# Patient Record
Sex: FEMALE | Race: BLACK OR AFRICAN AMERICAN | NOT HISPANIC OR LATINO | Employment: FULL TIME | URBAN - METROPOLITAN AREA
[De-identification: names, ages, dates, MRNs, and addresses within clinical notes are randomized per-mention and may not be internally consistent; named-entity substitution may affect disease eponyms.]

---

## 2017-01-04 ENCOUNTER — OFFICE VISIT (OUTPATIENT)
Dept: ORTHOPEDICS | Facility: CLINIC | Age: 32
End: 2017-01-04
Payer: COMMERCIAL

## 2017-01-04 VITALS
RESPIRATION RATE: 12 BRPM | HEART RATE: 80 BPM | DIASTOLIC BLOOD PRESSURE: 97 MMHG | SYSTOLIC BLOOD PRESSURE: 134 MMHG | HEIGHT: 71 IN | BODY MASS INDEX: 33.61 KG/M2 | WEIGHT: 240.06 LBS

## 2017-01-04 DIAGNOSIS — Z98.890 S/P ACL REPAIR: Primary | ICD-10-CM

## 2017-01-04 DIAGNOSIS — Z98.890 STATUS POST ARTHROSCOPY OF LEFT KNEE: ICD-10-CM

## 2017-01-04 PROCEDURE — 99999 PR PBB SHADOW E&M-EST. PATIENT-LVL III: CPT | Mod: PBBFAC,,, | Performed by: ORTHOPAEDIC SURGERY

## 2017-01-04 PROCEDURE — 99024 POSTOP FOLLOW-UP VISIT: CPT | Mod: S$GLB,,, | Performed by: ORTHOPAEDIC SURGERY

## 2017-01-04 NOTE — MR AVS SNAPSHOT
[facility]  [address] [facility]  [place] [address]  Phone: [phone]  Fax: [phone]                  Brian Moss   2017 3:30 PM   Office Visit    Description:  Female : 1985   Provider:  Sebas Valle MD   Department:  [facility]           Reason for Visit     Left Knee - Post-op Evaluation           Diagnoses this Visit        Comments    S/P ACL repair    -  Primary            To Do List           Future Appointments        Provider Department Dept Phone    2017 3:30 PM Sebas Valle MD [facility] [phone]      Goals (5 Years of Data)     None      Follow-Up and Disposition     Return in about 4 weeks (around 2017).      [facility] On Call     [facility] On Call Nurse Care Line -  Assistance  Registered nurses in the [facility] On Call Center provide clinical advisement, health education, appointment booking, and other advisory services.  Call for this free service at [phone].             Medications           Message regarding Medications     Verify the changes and/or additions to your medication regime listed below are the same as discussed with your clinician today.  If any of these changes or additions are incorrect, please notify your healthcare provider.             Verify that the below list of medications is an accurate representation of the medications you are currently taking.  If none reported, the list may be blank. If incorrect, please contact your healthcare provider. Carry this list with you in case of emergency.           Current Medications     meloxicam (MOBIC) 15 MG tablet Take one tab after breakfast and half of a tab after lunch every day.    MICROGESTIN FE , , 1 mg-20 mcg (21)/75 mg (7) per tablet     morphine (MS CONTIN) 15 MG 12 hr tablet Take 1-2 tablets every 8 hrs as needed for pain.    oxycodone (ROXICODONE) 5 MG immediate release tablet Take 1 tablet every 3-6 hrs as needed for breakthrough  "pain until done with MS Contin, then 1-2 Q 4-8 hrs as needed for pain.    promethazine (PHENERGAN) 25 MG tablet Take 1 tablet (25 mg total) by mouth every 6 (six) hours as needed for Nausea.           Clinical Reference Information           Vital Signs - Last Recorded  Most recent update: 1/4/2017  4:47 PM by Cedrick Johns MA    BP Pulse Resp Ht Wt LMP    (!) 134/97 80 12 5' 11" (1.803 m) 108.9 kg (240 lb 1.3 oz) 11/16/2016    BMI                33.48 kg/m2          Blood Pressure          Most Recent Value    BP  (!)  134/97      Allergies as of 1/4/2017     No Known Allergies      Immunizations Administered on Date of Encounter - 1/4/2017     None      "

## 2017-01-07 PROBLEM — Z98.890 STATUS POST ARTHROSCOPY OF LEFT KNEE: Status: ACTIVE | Noted: 2017-01-07

## 2017-01-07 PROBLEM — Z98.890 S/P ACL REPAIR: Status: ACTIVE | Noted: 2017-01-07

## 2017-01-07 NOTE — PROGRESS NOTES
Subjective:      Patient ID: Brian Moss is a 31 y.o. female.    Chief Complaint: Post-op Evaluation of the Left Knee    HPI: The patient is seen back status post anterior cruciate ligament reconstruction and arthroscopy of the left knee.    ROS      Objective:            Ortho/SPM Exam  Portals and incisions are clean and dry without any evidence of infection and staples were removed and office today without incident.  The patient's reconstruction is solid.  She will phase out of her brace and advance to full weightbearing as tolerated 3 weeks postoperatively.  She will be started on outpatient physical therapy and one half weeks.  She will be seen back in follow-up in 4 weeks for check on her progress.                  Assessment:           Encounter Diagnoses   Name Primary?    S/P ACL repair Yes    Status post arthroscopy of left knee           Plan:     Return to office in 4 weeks after starting outpatient physical therapy.

## 2017-01-09 ENCOUNTER — PATIENT MESSAGE (OUTPATIENT)
Dept: ORTHOPEDICS | Facility: CLINIC | Age: 32
End: 2017-01-09

## 2017-01-10 DIAGNOSIS — Z98.890 S/P ACL REPAIR: ICD-10-CM

## 2017-01-10 DIAGNOSIS — Z98.890 STATUS POST ARTHROSCOPY OF LEFT KNEE: Primary | ICD-10-CM

## 2017-01-17 ENCOUNTER — PATIENT MESSAGE (OUTPATIENT)
Dept: ORTHOPEDICS | Facility: CLINIC | Age: 32
End: 2017-01-17

## 2017-01-23 ENCOUNTER — CLINICAL SUPPORT (OUTPATIENT)
Dept: REHABILITATION | Facility: HOSPITAL | Age: 32
End: 2017-01-23
Attending: ORTHOPAEDIC SURGERY
Payer: COMMERCIAL

## 2017-01-23 DIAGNOSIS — Z98.890 STATUS POST ARTHROSCOPY OF LEFT KNEE: ICD-10-CM

## 2017-01-23 DIAGNOSIS — M22.42 CHONDROMALACIA OF LEFT PATELLA: ICD-10-CM

## 2017-01-23 DIAGNOSIS — S83.282D ACUTE LATERAL MENISCUS TEAR OF LEFT KNEE, SUBSEQUENT ENCOUNTER: ICD-10-CM

## 2017-01-23 DIAGNOSIS — M23.92 DERANGEMENT OF LEFT KNEE: ICD-10-CM

## 2017-01-23 DIAGNOSIS — Z98.890 S/P ACL REPAIR: Primary | ICD-10-CM

## 2017-01-23 PROCEDURE — 97014 ELECTRIC STIMULATION THERAPY: CPT

## 2017-01-23 PROCEDURE — 97110 THERAPEUTIC EXERCISES: CPT

## 2017-01-23 PROCEDURE — 97140 MANUAL THERAPY 1/> REGIONS: CPT

## 2017-01-24 NOTE — PROGRESS NOTES
"PHYSICAL THERAPY INITIAL OUTPATIENT EVALUATION    Referring Provider:  Dr. Sebas Valle    Diagnosis:       ICD-10-CM ICD-9-CM    1. S/P ACL repair Z98.890 V45.89    2. Status post arthroscopy of left knee Z98.890 V45.89    3. Acute lateral meniscus tear of left knee, subsequent encounter S83.282D V58.89      836.1    4. Derangement of left knee M23.92 717.9    5. Chondromalacia of left patella M22.42 717.7           Orders:  Evaluate and Treat  Date of Initial Evaluation: 1-23-17    Visit # 1     SUBJECTIVE:  Patient comes to PT s/p L ACL repair with allograft, meniscectomy, and chondroplasty on 12-20-16. Has been in brace constantly up until last week. Now allowed to remove per MD orders on her most recent follow up. States she is doing well overall and her pain levels are minimal . Primary issue is of stiffness. Was swollen but has gone down significantly. States she feels she is getting around ok but is " babying it a little" with gait. At home is doing ankle pumps frequently.     Occupation:     Pain: 0-3 /10, located generally across anterior left knee, described as aching.       OBJECTIVE:    Function:  LEFS = 59% disability .    Posture/ Observation: ambulates without assistive device , left leg held stiff with knee in slight flexion. Decreased left stance phase. Minimal swelling observed. Well healed incisions. No redness/ discoloration.     Gait: maintains left knee flexed , decreased stance and step length             EVAL       TODAY  Knee AROM:  Flexion      90     Extension    -20  Knee PROM  Flexion      92      Extension    -18     Hip ROM: WFL, mild decreased extension  Other ROM: WFL ankle ROM     Strength:    Quadriceps    3-/5   Hamstrings    3-/5  Gluteus Medius   4/5   Gluteus Rudy   4/5  Hip Flexors    WFL           Special Testing:   Circumferential Measures :   Left Right   10 cm above joint line  51 53  At joint line   42 40.5  10 cm below joint line "   40 41    Palpation:  Mild and general tenderness to palpation about knee.       ASSESSMENT:  The patient is a 32 y.o. year old female who presents to physical therapy with complaints of knee stiffness and pain s/p ACL repair. Patient's impairments include loss of ROM, decreased strength, mild swelling, and pain.  These impairments are limiting patient's ability to ambulate and perform ADLs, or return to active lifestyle.  Patient's prognosis is good.  Patient will benefit from skilled physical therapy intervention to increase strength through lower quadrant and restore full ROM. As well as progress back to all ADLs and activities.     Co-morbidities which may impact the plan of care and potentially impede the patient's progress in therapy include:   Hypertension  Patients CLINICAL PRESENTATION STABLE    Short Term Goals:    1. Pt will report a subjective decrease in pain.   2. Pt will be instructed in home exercise program / self care   3. Knee extension to full actively and 110 flexion  4. Pt to ambulate with equal stance length and duration    Long Term Goals:  1. Pt to have full knee ROM by week 12 post operatively  2. Quad strength 4/5 or greater by week 10  3. Ambulate community distances pain free  4. Patient independent with all home exercises and self care     TREATMENT PROVIDED:  -Evaluation  -Manual Therapy:  Patellar mobs, STM to distal quads  -Therapeutic Exercise:  Quad sets 30 x with 5 sec hold, SLR in flex , abd and ext 3 x 10 , heel slides - AAROM and PROM, ankle pumps  -Modalities: IFC and heat  -Education on condition and HEP    PLAN:  Patient will benefit from physical therapy (2-3) x/week for (4-6) weeks including manual therapy, therapeutic exercise, functional activities, modalities, and patient education.    Thank you for this referral.      These services are reasonable and necessary for the conditions set forth above while under my care.

## 2017-01-25 ENCOUNTER — CLINICAL SUPPORT (OUTPATIENT)
Dept: REHABILITATION | Facility: HOSPITAL | Age: 32
End: 2017-01-25
Attending: ORTHOPAEDIC SURGERY
Payer: COMMERCIAL

## 2017-01-25 DIAGNOSIS — Z98.890 S/P ACL REPAIR: Primary | ICD-10-CM

## 2017-01-25 DIAGNOSIS — S83.282D ACUTE LATERAL MENISCUS TEAR OF LEFT KNEE, SUBSEQUENT ENCOUNTER: ICD-10-CM

## 2017-01-25 DIAGNOSIS — M23.92 DERANGEMENT OF LEFT KNEE: ICD-10-CM

## 2017-01-25 DIAGNOSIS — M22.42 CHONDROMALACIA OF LEFT PATELLA: ICD-10-CM

## 2017-01-25 DIAGNOSIS — Z98.890 STATUS POST ARTHROSCOPY OF LEFT KNEE: ICD-10-CM

## 2017-01-25 PROCEDURE — 97014 ELECTRIC STIMULATION THERAPY: CPT

## 2017-01-25 PROCEDURE — 97110 THERAPEUTIC EXERCISES: CPT

## 2017-01-25 PROCEDURE — 97140 MANUAL THERAPY 1/> REGIONS: CPT

## 2017-01-25 NOTE — PROGRESS NOTES
"PHYSICAL THERAPY INITIAL OUTPATIENT EVALUATION    Referring Provider:  Dr. Sebas Valle    Diagnosis:       ICD-10-CM ICD-9-CM    1. S/P ACL repair Z98.890 V45.89    2. Status post arthroscopy of left knee Z98.890 V45.89    3. Acute lateral meniscus tear of left knee, subsequent encounter S83.282D V58.89      836.1    4. Derangement of left knee M23.92 717.9    5. Chondromalacia of left patella M22.42 717.7           Orders:  Evaluate and Treat  Date : 1-25-17    Visit # 2     SUBJECTIVE:  Reports doing well. Is doing HEP . No pain at rest . Only with WB / gait, or with full flexion. Is doing HEP regularly .        Patient comes to PT s/p L ACL repair with allograft, meniscectomy, and chondroplasty on 12-20-16. Has been in brace constantly up until last week. Now allowed to remove per MD orders on her most recent follow up. States she is doing well overall and her pain levels are minimal . Primary issue is of stiffness. Was swollen but has gone down significantly. States she feels she is getting around ok but is " babying it a little" with gait.   Occupation:     Pain: 0-3 /10, located generally across anterior left knee, described as aching.       OBJECTIVE:    Posture/ Observation: ambulates without assistive device , left leg held stiff with knee in slight flexion. Decreased left stance phase. Minimal swelling observed. Well healed incisions. No redness/ discoloration.     Gait: maintains left knee flexed , decreased stance and step length             EVAL        TODAY  Knee AROM:  Flexion      90  95     Extension    -20  -10  Knee PROM  Flexion      92  100     Extension    -18  -5    Strength:    Quadriceps    3-/5   Hamstrings    3+/5  Gluteus Medius   4/5   Gluteus Rudy   4/5  Hip Flexors    WFL           Palpation:  Mild and general tenderness to palpation about knee.     TREATMENT PROVIDED:  -Manual Therapy:  Patellar mobs, STM to distal quads  -Therapeutic Exercise:  Quad sets 30 x " with 5 sec hold, SLR in flex , abd and ext 3 x 10 , heel slides - AAROM and PROM, ankle pumps, assisted SAQ 30x , hams stretch , hams curls 30x   -Modalities: IFC and heat  -Education on condition and HEP    ASSESSMENT:  The patient is a 32 y.o. year old female who presents to physical therapy with complaints of knee stiffness and pain s/p ACL repair.   Improving ROM today- tolerating all treatment well with good effort. Close to full extension.     Co-morbidities which may impact the plan of care and potentially impede the patient's progress in therapy include:   Hypertension  Patients CLINICAL PRESENTATION STABLE    Short Term Goals:    1. Pt will report a subjective decrease in pain.   2. Pt will be instructed in home exercise program / self care   3. Knee extension to full actively and 110 flexion  4. Pt to ambulate with equal stance length and duration    Long Term Goals:  1. Pt to have full knee ROM by week 12 post operatively  2. Quad strength 4/5 or greater by week 10  3. Ambulate community distances pain free  4. Patient independent with all home exercises and self care     PLAN:  Patient will benefit from physical therapy (2-3) x/week for (4-6) weeks including manual therapy, therapeutic exercise, functional activities, modalities, and patient education.    These services are reasonable and necessary for the conditions set forth above while under my care.

## 2017-02-01 ENCOUNTER — OFFICE VISIT (OUTPATIENT)
Dept: ORTHOPEDICS | Facility: CLINIC | Age: 32
End: 2017-02-01
Payer: COMMERCIAL

## 2017-02-01 ENCOUNTER — CLINICAL SUPPORT (OUTPATIENT)
Dept: REHABILITATION | Facility: HOSPITAL | Age: 32
End: 2017-02-01
Attending: ORTHOPAEDIC SURGERY
Payer: COMMERCIAL

## 2017-02-01 VITALS
DIASTOLIC BLOOD PRESSURE: 90 MMHG | SYSTOLIC BLOOD PRESSURE: 138 MMHG | HEIGHT: 71 IN | HEART RATE: 71 BPM | BODY MASS INDEX: 33.61 KG/M2 | WEIGHT: 240.06 LBS

## 2017-02-01 DIAGNOSIS — Z09 POSTOP CHECK: ICD-10-CM

## 2017-02-01 DIAGNOSIS — M23.92 DERANGEMENT OF LEFT KNEE: ICD-10-CM

## 2017-02-01 DIAGNOSIS — Z98.890 STATUS POST ARTHROSCOPY OF LEFT KNEE: ICD-10-CM

## 2017-02-01 DIAGNOSIS — Z98.890 S/P ACL REPAIR: Primary | ICD-10-CM

## 2017-02-01 DIAGNOSIS — M22.42 CHONDROMALACIA OF LEFT PATELLA: ICD-10-CM

## 2017-02-01 PROCEDURE — 99999 PR PBB SHADOW E&M-EST. PATIENT-LVL III: CPT | Mod: PBBFAC,,, | Performed by: ORTHOPAEDIC SURGERY

## 2017-02-01 PROCEDURE — 97140 MANUAL THERAPY 1/> REGIONS: CPT

## 2017-02-01 PROCEDURE — 97110 THERAPEUTIC EXERCISES: CPT

## 2017-02-01 PROCEDURE — 97014 ELECTRIC STIMULATION THERAPY: CPT

## 2017-02-01 PROCEDURE — 99024 POSTOP FOLLOW-UP VISIT: CPT | Mod: S$GLB,,, | Performed by: ORTHOPAEDIC SURGERY

## 2017-02-01 RX ORDER — MELOXICAM 15 MG/1
TABLET ORAL
Qty: 45 TABLET | Refills: 2 | Status: SHIPPED | OUTPATIENT
Start: 2017-02-01 | End: 2017-03-16

## 2017-02-01 NOTE — PROGRESS NOTES
Subjective:      Patient ID: Brian Moss is a 32 y.o. female.    Chief Complaint: Post-op Evaluation and Pain of the Left Knee and Post-op Evaluation (left knee ATS)    HPI: The patient is seen back status post anterior cruciate ligament reconstruction and arthroscopy of the left knee.  She is 6 weeks out from surgery.  She lacks last degree or 2 of terminal extension however she has full flexion.    ROS      Objective:            Ortho/SPM Exam      The patient walks with a fairly fluid gait.  She gets in and out of chair easily.  Her knee is rock stable on exam.  She lacks last degree or 2 of terminal extension.            Assessment:           Encounter Diagnoses   Name Primary?    S/P ACL repair Yes    Postop check           Plan:     The patient is going to continue with aggressive physical therapy.  She understands importance of achieving full extension.  She is scheduled to go back to work on Monday.  She was cautioned to avoid climbing poles for another 4 weeks.  The patient did not need or desire a work note today.  She will continue to take Mobic 15 mg by mouth every morning and 7.5 mg by mouth every afternoon with food.

## 2017-02-01 NOTE — PROGRESS NOTES
"PHYSICAL THERAPY INITIAL OUTPATIENT EVALUATION    Referring Provider:  Dr. Sebas Valle    Diagnosis:       ICD-10-CM ICD-9-CM    1. S/P ACL repair Z98.890 V45.89    2. Chondromalacia of left patella M22.42 717.7    3. Derangement of left knee M23.92 717.9    4. Status post arthroscopy of left knee Z98.890 V45.89           Orders:  Evaluate and Treat  Date : 2-1-17    Visit # 3     SUBJECTIVE:  Reports doing ok overall. Is doing HEP . No pain at rest . States that her pain levels are good - her main problem is with walking and occasional feeling of "locking" / difficulty moving ( stiff)     Patient comes to PT s/p L ACL repair with allograft, meniscectomy, and chondroplasty on 12-20-16. Has been in brace constantly up until last week. Now allowed to remove per MD orders on her most recent follow up. States she is doing well overall and her pain levels are minimal . Primary issue is of stiffness. Was swollen but has gone down significantly. States she feels she is getting around ok but is " babying it a little" with gait.   Occupation:     Pain: 0-3 /10, located generally across anterior left knee, described as aching.     OBJECTIVE:    Posture/ Observation: ambulates without assistive device , left leg held stiff with knee in slight flexion. Decreased left stance phase.     Gait: maintains left knee slightly flexed - needs cues for extending , mildly decreased stance and step length             EVAL        TODAY  Knee AROM:  Flexion      90  100     Extension    -20  -8  Knee PROM  Flexion      92  110     Extension    -18  -5    Strength:    Quadriceps    3-/5   Hamstrings    3+/5  Gluteus Medius   4/5   Gluteus Rudy   4/5  Hip Flexors    WFL           Palpation:  Mild and general tenderness to palpation about knee.     TREATMENT PROVIDED:  -Manual Therapy: ( 15 mins) Patellar mobs, STM to distal quads and to hamstrings    -Therapeutic Exercise: ( 45 mins)  Quad sets 30 x with 5 sec hold, SLR " in flex , abd and ext 3 x 10 , heel slides - AAROM and PROM, ankle pumps, assisted SAQ 30x , hams stretch , hams curls 30x , prone knee hang , standing mini squats w ball at wall x 30 , standing TKEs with blue band 3 x 10 , SLS 4 x 30 , manually resisted quad sets,     -Modalities: IFC and heat    -Patient Education on condition and HEP      ASSESSMENT:  The patient is a 32 y.o. year old female who presents to physical therapy with complaints of knee stiffness and pain s/p ACL repair.   Improving ROM today- tolerating all treatment well with good effort. Close to full extension but still slightly lacking. Progressing well with weight bearing exercises and is ready to continue with such.     Co-morbidities which may impact the plan of care and potentially impede the patient's progress in therapy include:   Hypertension  Patients CLINICAL PRESENTATION STABLE    Short Term Goals:    1. Pt will report a subjective decrease in pain. MET  2. Pt will be instructed in home exercise program / self care  PARTIALLY MET  3. Knee extension to full actively and 110 flexion  PARTIALLY MET   4. Pt to ambulate with equal stance length and duration    Long Term Goals:  1. Pt to have full knee ROM by week 12 post operatively  2. Quad strength 4/5 or greater by week 10  3. Ambulate community distances pain free  4. Patient independent with all home exercises and self care     PLAN:  Patient will benefit from continued physical therapy (2-3) x/week for treatment including manual therapy, therapeutic exercise, functional activities, modalities, and patient education.    These services are reasonable and necessary for the conditions set forth above while under my care.

## 2017-02-01 NOTE — MR AVS SNAPSHOT
[facility]  [address] [facility]  [place] [address]  Phone: [phone]  Fax: [phone]                  Brian Moss   2017 3:30 PM   Office Visit    Description:  Female : 1985   Provider:  Sebas Valle MD   Department:  [facility]           Reason for Visit     Left Knee - Post-op Evaluation, Pain     Post-op Evaluation           Diagnoses this Visit        Comments    S/P ACL repair    -  Primary     Postop check                To Do List           Future Appointments        Provider Department Dept Phone    2/3/2017 9:00 AM Salazar Man PT [facility] [phone]    3/8/2017 8:15 AM Lenore Hendricks PA-C [facility] [phone]      Goals (5 Years of Data)     None      Follow-Up and Disposition     Return in about 5 weeks (around 3/8/2017).       These Medications        Disp Refills Start End    meloxicam (MOBIC) 15 MG tablet 45 tablet 2 2017     Take one tab after breakfast and half of a tab after lunch every day.    Pharmacy: [id number] - [address] #: [phone]         [facility] On Call     [facility] On Call Nurse Care Line -  Assistance  Registered nurses in the [facility] On Call Center provide clinical advisement, health education, appointment booking, and other advisory services.  Call for this free service at [phone].             Medications           Message regarding Medications     Verify the changes and/or additions to your medication regime listed below are the same as discussed with your clinician today.  If any of these changes or additions are incorrect, please notify your healthcare provider.             Verify that the below list of medications is an accurate representation of the medications you are currently taking.  If none reported, the list may be blank. If incorrect, please contact your healthcare provider. Carry this list with you in  "case of emergency.           Current Medications     meloxicam (MOBIC) 15 MG tablet Take one tab after breakfast and half of a tab after lunch every day.    MICROGESTIN FE 1/20, 28, 1 mg-20 mcg (21)/75 mg (7) per tablet     morphine (MS CONTIN) 15 MG 12 hr tablet Take 1-2 tablets every 8 hrs as needed for pain.    oxycodone (ROXICODONE) 5 MG immediate release tablet Take 1 tablet every 3-6 hrs as needed for breakthrough pain until done with MS Contin, then 1-2 Q 4-8 hrs as needed for pain.    promethazine (PHENERGAN) 25 MG tablet Take 1 tablet (25 mg total) by mouth every 6 (six) hours as needed for Nausea.           Clinical Reference Information           Vital Signs - Last Recorded  Most recent update: 2/1/2017  4:57 PM by Vicki Arreaga MA    BP Pulse Ht Wt BMI    (!) 138/90 71 5' 11" (1.803 m) 108.9 kg (240 lb 1.3 oz) 33.48 kg/m2      Blood Pressure          Most Recent Value    BP  (!)  138/90      Allergies as of 2/1/2017     No Known Allergies      Immunizations Administered on Date of Encounter - 2/1/2017     None      "

## 2017-02-02 ENCOUNTER — PATIENT MESSAGE (OUTPATIENT)
Dept: ORTHOPEDICS | Facility: CLINIC | Age: 32
End: 2017-02-02

## 2017-02-03 ENCOUNTER — CLINICAL SUPPORT (OUTPATIENT)
Dept: REHABILITATION | Facility: HOSPITAL | Age: 32
End: 2017-02-03
Attending: ORTHOPAEDIC SURGERY
Payer: COMMERCIAL

## 2017-02-03 DIAGNOSIS — M23.92 DERANGEMENT OF LEFT KNEE: ICD-10-CM

## 2017-02-03 DIAGNOSIS — Z98.890 STATUS POST ARTHROSCOPY OF LEFT KNEE: Primary | ICD-10-CM

## 2017-02-03 DIAGNOSIS — Z98.890 S/P ACL REPAIR: ICD-10-CM

## 2017-02-03 PROCEDURE — 97110 THERAPEUTIC EXERCISES: CPT

## 2017-02-03 PROCEDURE — 97014 ELECTRIC STIMULATION THERAPY: CPT

## 2017-02-03 PROCEDURE — 97140 MANUAL THERAPY 1/> REGIONS: CPT

## 2017-02-03 NOTE — PROGRESS NOTES
"PHYSICAL THERAPY OUTPATIENT VISIT    Referring Provider:  Dr. Sebas Valle    Diagnosis:       ICD-10-CM ICD-9-CM    1. Status post arthroscopy of left knee Z98.890 V45.89    2. S/P ACL repair Z98.890 V45.89    3. Derangement of left knee M23.92 717.9           Orders:  Evaluate and Treat  Date : 2-1-17    Visit # 4     SUBJECTIVE:  Reports doing well. Still stiff with loss of full motionl. Is doing HEP . No pain at rest . Still stiff. Is difficult with bending .     Patient comes to PT s/p L ACL repair with allograft, meniscectomy, and chondroplasty on 12-20-16. Has been in brace constantly up until last week. Now allowed to remove per MD orders on her most recent follow up. States she is doing well overall and her pain levels are minimal . Primary issue is of stiffness. Was swollen but has gone down significantly. States she feels she is getting around ok but is " babying it a little" with gait.   Occupation:     Pain: 0-3 /10, located generally across anterior left knee, described as aching.     OBJECTIVE:    Posture/ Observation: ambulates without assistive device , left leg held stiff with knee in slight flexion. Decreased left stance phase.     Gait: maintains left knee slightly flexed - needs cues for extending , mildly decreased stance and step length             EVAL        TODAY  Knee AROM:  Flexion      90  100     Extension    -20  -5  Knee PROM  Flexion      92  110     Extension    -18  -2    Strength:    Quadriceps    3+/5   Hamstrings    3+/5  Gluteus Medius   4/5   Gluteus Rudy   4/5  Hip Flexors    WFL           Palpation:  Mild and general tenderness to palpation about knee.     TREATMENT PROVIDED:  -Manual Therapy: ( 15 mins) Patellar mobs, knee distraction mobs - grade 1-2, STM to distal quads and to hamstrings    -Therapeutic Exercise: ( 40 mins)  Quad sets 30 x with 5 sec hold, SLR in flex , abd and ext 3 x 10 , heel slides - AAROM and PROM, ankle pumps, assisted SAQ 30x " , hams stretch , hams curls 30x , prone knee hang , standing mini squats w ball at wall x 30 , standing TKEs with blue band 3 x 10 , SLS 4 x 30 , manually resisted quad sets,     -Modalities: IFC and heat    -Patient Education on condition and HEP      ASSESSMENT:  The patient is a 32 y.o. year old female who presents to physical therapy with complaints of knee stiffness and pain s/p ACL repair.   Overall progressing well with incresaed ROM- still lacking full knee hyperextension . Will benefit from continued therapeutic exercise.     Co-morbidities which may impact the plan of care and potentially impede the patient's progress in therapy include:   Hypertension  Patients CLINICAL PRESENTATION STABLE    Short Term Goals:    1. Pt will report a subjective decrease in pain. MET  2. Pt will be instructed in home exercise program / self care  PARTIALLY MET  3. Knee extension to full actively and 110 flexion  PARTIALLY MET   4. Pt to ambulate with equal stance length and duration    Long Term Goals:  1. Pt to have full knee ROM by week 12 post operatively  2. Quad strength 4/5 or greater by week 10  3. Ambulate community distances pain free  4. Patient independent with all home exercises and self care     PLAN:  Patient will benefit from continued physical therapy (2-3) x/week for treatment including manual therapy, therapeutic exercise, functional activities, modalities, and patient education.    These services are reasonable and necessary for the conditions set forth above while under my care.

## 2017-02-06 ENCOUNTER — CLINICAL SUPPORT (OUTPATIENT)
Dept: REHABILITATION | Facility: HOSPITAL | Age: 32
End: 2017-02-06
Attending: ORTHOPAEDIC SURGERY
Payer: COMMERCIAL

## 2017-02-06 ENCOUNTER — TELEPHONE (OUTPATIENT)
Dept: ORTHOPEDICS | Facility: CLINIC | Age: 32
End: 2017-02-06

## 2017-02-06 ENCOUNTER — PATIENT MESSAGE (OUTPATIENT)
Dept: ORTHOPEDICS | Facility: CLINIC | Age: 32
End: 2017-02-06

## 2017-02-06 DIAGNOSIS — Z98.890 STATUS POST ARTHROSCOPY OF LEFT KNEE: ICD-10-CM

## 2017-02-06 DIAGNOSIS — Z98.890 S/P ACL REPAIR: Primary | ICD-10-CM

## 2017-02-06 DIAGNOSIS — M23.92 DERANGEMENT OF LEFT KNEE: ICD-10-CM

## 2017-02-06 PROCEDURE — 97140 MANUAL THERAPY 1/> REGIONS: CPT

## 2017-02-06 PROCEDURE — 97014 ELECTRIC STIMULATION THERAPY: CPT

## 2017-02-06 PROCEDURE — 97110 THERAPEUTIC EXERCISES: CPT

## 2017-02-06 NOTE — PROGRESS NOTES
"PHYSICAL THERAPY OUTPATIENT VISIT    Referring Provider:  Dr. Sebas Valle    Diagnosis:       ICD-10-CM ICD-9-CM    1. S/P ACL repair Z98.890 V45.89    2. Status post arthroscopy of left knee Z98.890 V45.89    3. Derangement of left knee M23.92 717.9           Orders:  Evaluate and Treat  Date : 2-6-17    Visit # 5    SUBJECTIVE:  Overall doing well. Feels that she is getting knee more straight into extension. Has been doing HEP . Feels ok with walking. Pain and "catching" occurs when she is going from straight to flexion ( heel slide)     History: Patient comes to PT s/p L ACL repair with allograft, meniscectomy, and chondroplasty on 12-20-16. Has been in brace constantly up until last week. Now allowed to remove per MD orders on her most recent follow up. States she is doing well overall and her pain levels are minimal . Primary issue is of stiffness. Was swollen but has gone down significantly. States she feels she is getting around ok but is " babying it a little" with gait.   Occupation:     Pain: 0-3 /10, located generally across anterior left knee, described as aching.     OBJECTIVE:    Posture/ Observation: ambulates without assistive device , left leg held stiff with knee in slight flexion. Decreased left stance phase.     Gait: maintains left knee slightly flexed - needs cues for extending , mildly decreased stance and step length             EVAL        TODAY  Knee AROM:  Flexion      90  115     Extension    -20  -2  Knee PROM  Flexion      92  120     Extension    -18  0    Strength:    Quadriceps    4/5   Hamstrings    4/5  Gluteus Medius   4/5   Gluteus Rudy   4/5  Hip Flexors    WFL           Palpation:  Mild and general tenderness to palpation about knee.     TREATMENT PROVIDED:  -Manual Therapy: ( 15 mins) Patellar mobs, knee distraction mobs - grade 1-2, STM to distal quads and to hamstrings    -Therapeutic Exercise: ( 40 mins)  Quad sets 30 x with 5 sec hold, SLR in flex , " abd and ext 3 x 10 , heel slides - AAROM and PROM, ankle pumps, assisted SAQ 30x , hams stretch , hams curls 30x , prone knee hang , standing mini squats w ball at wall x 30 , standing TKEs with blue band 3 x 10 , SLS 4 x 30 , manually resisted quad sets,     -Modalities: IFC and heat    -Patient Education on condition and HEP      ASSESSMENT:  The patient is a 32 y.o. year old female who presents to physical therapy with complaints of knee stiffness and pain s/p ACL repair.   Overall progressing well with incresaed ROM- still lacking full knee hyperextension . Will benefit from continued therapeutic exercise.     Co-morbidities which may impact the plan of care and potentially impede the patient's progress in therapy include:   Hypertension  Patients CLINICAL PRESENTATION STABLE    Short Term Goals:    1. Pt will report a subjective decrease in pain. MET  2. Pt will be instructed in home exercise program / self care  MET  3. Knee extension to full actively and 110 flexion  ALMOST MET   4. Pt to ambulate with equal stance length and duration    Long Term Goals:  1. Pt to have full knee ROM by week 12 post operatively  2. Quad strength 4/5 or greater by week 10  3. Ambulate community distances pain free  4. Patient independent with all home exercises and self care     PLAN:  Patient will benefit from continued physical therapy (2-3) x/week for treatment including manual therapy, therapeutic exercise, functional activities, modalities, and patient education.    These services are reasonable and necessary for the conditions set forth above while under my care.

## 2017-02-13 ENCOUNTER — CLINICAL SUPPORT (OUTPATIENT)
Dept: REHABILITATION | Facility: HOSPITAL | Age: 32
End: 2017-02-13
Attending: ORTHOPAEDIC SURGERY
Payer: COMMERCIAL

## 2017-02-13 DIAGNOSIS — Z98.890 S/P ACL REPAIR: Primary | ICD-10-CM

## 2017-02-13 DIAGNOSIS — M23.92 DERANGEMENT OF LEFT KNEE: ICD-10-CM

## 2017-02-13 DIAGNOSIS — S83.282D ACUTE LATERAL MENISCUS TEAR OF LEFT KNEE, SUBSEQUENT ENCOUNTER: ICD-10-CM

## 2017-02-13 DIAGNOSIS — M22.42 CHONDROMALACIA OF LEFT PATELLA: ICD-10-CM

## 2017-02-13 DIAGNOSIS — Z98.890 STATUS POST ARTHROSCOPY OF LEFT KNEE: ICD-10-CM

## 2017-02-13 PROCEDURE — 97140 MANUAL THERAPY 1/> REGIONS: CPT

## 2017-02-13 PROCEDURE — 97110 THERAPEUTIC EXERCISES: CPT

## 2017-02-13 PROCEDURE — 97014 ELECTRIC STIMULATION THERAPY: CPT

## 2017-02-15 ENCOUNTER — CLINICAL SUPPORT (OUTPATIENT)
Dept: REHABILITATION | Facility: HOSPITAL | Age: 32
End: 2017-02-15
Attending: ORTHOPAEDIC SURGERY
Payer: COMMERCIAL

## 2017-02-15 DIAGNOSIS — M23.92 DERANGEMENT OF LEFT KNEE: ICD-10-CM

## 2017-02-15 DIAGNOSIS — Z98.890 S/P ACL REPAIR: Primary | ICD-10-CM

## 2017-02-15 DIAGNOSIS — M22.42 CHONDROMALACIA OF LEFT PATELLA: ICD-10-CM

## 2017-02-15 DIAGNOSIS — Z98.890 STATUS POST ARTHROSCOPY OF LEFT KNEE: ICD-10-CM

## 2017-02-15 DIAGNOSIS — S83.282D ACUTE LATERAL MENISCUS TEAR OF LEFT KNEE, SUBSEQUENT ENCOUNTER: ICD-10-CM

## 2017-02-15 PROCEDURE — 97014 ELECTRIC STIMULATION THERAPY: CPT

## 2017-02-15 PROCEDURE — 97110 THERAPEUTIC EXERCISES: CPT

## 2017-02-15 PROCEDURE — 97140 MANUAL THERAPY 1/> REGIONS: CPT

## 2017-02-15 NOTE — PROGRESS NOTES
"PHYSICAL THERAPY OUTPATIENT VISIT    Referring Provider:  Dr. Sebas Valle    Diagnosis:       ICD-10-CM ICD-9-CM    1. S/P ACL repair Z98.890 V45.89    2. Status post arthroscopy of left knee Z98.890 V45.89    3. Derangement of left knee M23.92 717.9    4. Chondromalacia of left patella M22.42 717.7    5. Acute lateral meniscus tear of left knee, subsequent encounter S83.282D V58.89      836.1           Orders:  Evaluate and Treat  Date : 2-6-17    Visit # 6    SUBJECTIVE:  Overall doing well and is doing HEP . Feels ok but does get occasional "catches" in the knee when bending it ( heel slides) . She still has notable stiffness and difficulty going into full extension.     History: Patient comes to PT s/p L ACL repair with allograft, meniscectomy, and chondroplasty on 12-20-16. Has been in brace constantly up until last week. Now allowed to remove per MD orders on her most recent follow up. States she is doing well overall and her pain levels are minimal . Primary issue is of stiffness. Was swollen but has gone down significantly. States she feels she is getting around ok but is " babying it a little" with gait.   Occupation:     Pain: 0-3 /10, located generally across anterior left knee, described as aching.     OBJECTIVE:    Posture/ Observation: ambulates without assistive device , left leg held stiff with knee in slight flexion. Decreased left stance phase.     Gait: maintains left knee slightly flexed - needs cues for extending , mildly decreased stance and step length             EVAL        TODAY  Knee AROM:  Flexion      90  115     Extension    -20  -2  Knee PROM  Flexion      92  120     Extension    -18  0    Strength:    Quadriceps    4/5   Hamstrings    4/5  Gluteus Medius   4/5   Gluteus Rudy   4/5  Hip Flexors    WFL           Palpation:  Mild and general tenderness to palpation about knee.     TREATMENT PROVIDED:  -Manual Therapy: ( 15 mins) Patellar mobs, knee distraction " mobs - grade 1-2, STM to distal quads and to hamstrings    -Therapeutic Exercise: ( 40 mins)  Quad sets 30 x with 5 sec hold, SLR in flex , abd and ext 3 x 10 , heel slides - AAROM and PROM, ankle pumps, assisted SAQ 30x , hams stretch , hams curls 30x , prone knee hang , standing mini squats w ball at wall x 30 , standing TKEs with blue band 3 x 10 , SLS 4 x 30 , manually resisted quad sets,     -Modalities: IFC and heat    -Patient Education on condition and HEP      ASSESSMENT:  The patient is a 32 y.o. year old female who presents to physical therapy with complaints of knee stiffness and pain s/p ACL repair.   Overall progressing well with incresaed ROM- Needs to gain full extension during walking - antalgic gait with knee flexion is leading to increased stress on knee and pain.     Co-morbidities which may impact the plan of care and potentially impede the patient's progress in therapy include:   Hypertension  Patients CLINICAL PRESENTATION STABLE    Short Term Goals:    1. Pt will report a subjective decrease in pain. MET  2. Pt will be instructed in home exercise program / self care  MET  3. Knee extension to full actively and 110 flexion  ALMOST MET   4. Pt to ambulate with equal stance length and duration    Long Term Goals:  1. Pt to have full knee ROM by week 12 post operatively  2. Quad strength 4/5 or greater by week 10  3. Ambulate community distances pain free  4. Patient independent with all home exercises and self care     PLAN:  Patient will benefit from continued physical therapy (2-3) x/week for treatment including manual therapy, therapeutic exercise, functional activities, modalities, and patient education.    These services are reasonable and necessary for the conditions set forth above while under my care.

## 2017-02-15 NOTE — PROGRESS NOTES
"PHYSICAL THERAPY OUTPATIENT VISIT    Referring Provider:  Dr. Sebas Valle    Diagnosis:       ICD-10-CM ICD-9-CM    1. S/P ACL repair Z98.890 V45.89    2. Status post arthroscopy of left knee Z98.890 V45.89    3. Derangement of left knee M23.92 717.9    4. Chondromalacia of left patella M22.42 717.7    5. Acute lateral meniscus tear of left knee, subsequent encounter S83.282D V58.89      836.1           Orders:  Evaluate and Treat  Date : 2-6-17    Visit # 6    SUBJECTIVE:  Overall doing well. Feels that her range into extension is better - when hanging off bed can get almost straight but with gait knee stays flexed . Reports doing HEP regularly.     History: Patient comes to PT s/p L ACL repair with allograft, meniscectomy, and chondroplasty on 12-20-16. Has been in brace constantly up until last week. Now allowed to remove per MD orders on her most recent follow up. States she is doing well overall and her pain levels are minimal . Primary issue is of stiffness. Was swollen but has gone down significantly. States she feels she is getting around ok but is " babying it a little" with gait.   Occupation:     Pain: 0-3 /10, located generally across anterior left knee, described as aching.     OBJECTIVE:    Posture/ Observation: ambulates without assistive device , left leg held stiff with knee in slight flexion. Decreased left stance phase.     Gait: maintains left knee slightly flexed - needs cues for extending , mildly decreased stance and step length             EVAL        TODAY  Knee AROM:  Flexion      90  115     Extension    -20  -2  Knee PROM  Flexion      92  120     Extension    -18  0    Strength:    Quadriceps    4/5   Hamstrings    4/5  Gluteus Medius   4/5   Gluteus Rudy   4/5  Hip Flexors    WFL           Palpation:  Mild and general tenderness to palpation about knee.     TREATMENT PROVIDED:  -Manual Therapy: ( 15 mins) Patellar mobs, knee distraction mobs - grade 1-2, STM to " hamstrings in prone hang    -Therapeutic Exercise: ( 40 mins)  Quad sets 30 x with 5 sec hold, SLR in flex , abd and ext 3 x 10 , heel slides - AAROM and PROM, ankle pumps, assisted SAQ 30x , hams stretch , hams curls 30x , prone knee hang x 4 mins and with hams curls x 10  , standing mini squats w ball at wall x 30 , standing TKEs with blue band 3 x 10 , SLS 4 x 30 , manually resisted quad sets x 30 , shuttle single leg squats x 30 with 3 bands      -Modalities: IFC and heat    -Patient Education on condition and HEP      ASSESSMENT:  The patient is a 32 y.o. year old female who presents to physical therapy with complaints of knee stiffness and pain s/p ACL repair.   Progressing well. Was slow to regain extension post operatively has been focused on such and gradually gaining. Today able to get close to full extension with quad set and prone knee hang.    Co-morbidities which may impact the plan of care and potentially impede the patient's progress in therapy include:   Hypertension  Patients CLINICAL PRESENTATION STABLE    Short Term Goals:    1. Pt will report a subjective decrease in pain. MET  2. Pt will be instructed in home exercise program / self care  MET  3. Knee extension to full actively and 110 flexion  ALMOST MET   4. Pt to ambulate with equal stance length and duration    Long Term Goals:  1. Pt to have full knee ROM by week 12 post operatively  2. Quad strength 4/5 or greater by week 10  3. Ambulate community distances pain free  4. Patient independent with all home exercises and self care     PLAN:  Patient will benefit from continued physical therapy (2-3) x/week for treatment including manual therapy, therapeutic exercise, functional activities, modalities, and patient education.    These services are reasonable and necessary for the conditions set forth above while under my care.

## 2017-03-08 ENCOUNTER — OFFICE VISIT (OUTPATIENT)
Dept: ORTHOPEDICS | Facility: CLINIC | Age: 32
End: 2017-03-08
Payer: COMMERCIAL

## 2017-03-08 VITALS — RESPIRATION RATE: 12 BRPM | DIASTOLIC BLOOD PRESSURE: 98 MMHG | SYSTOLIC BLOOD PRESSURE: 137 MMHG | HEART RATE: 76 BPM

## 2017-03-08 DIAGNOSIS — Z09 POSTOP CHECK: ICD-10-CM

## 2017-03-08 DIAGNOSIS — Z98.890 STATUS POST ARTHROSCOPY OF LEFT KNEE: ICD-10-CM

## 2017-03-08 DIAGNOSIS — Z98.890 S/P ACL REPAIR: Primary | ICD-10-CM

## 2017-03-08 PROCEDURE — 99024 POSTOP FOLLOW-UP VISIT: CPT | Mod: S$GLB,,, | Performed by: PHYSICIAN ASSISTANT

## 2017-03-08 PROCEDURE — 99999 PR PBB SHADOW E&M-EST. PATIENT-LVL III: CPT | Mod: PBBFAC,,, | Performed by: PHYSICIAN ASSISTANT

## 2017-03-08 NOTE — MR AVS SNAPSHOT
[facility]  [address] [facility] [address]  Phone: [phone]  Fax: [phone]                  Brain Moss   3/8/2017 8:15 AM   Office Visit    Description:  Female : 1985   Provider:  Lenore Hendricks PA-C   Department:  [facility]           Reason for Visit     Left Knee - Post-op Evaluation           Diagnoses this Visit        Comments    S/P ACL repair    -  Primary     Status post arthroscopy of left knee         Postop check                To Do List           Future Appointments        Provider Department Dept Phone    5/3/2017 8:30 AM Lenore Hendricks PA-C [facility] [phone]      Goals (5 Years of Data)     None      Follow-Up and Disposition     Return in about 8 weeks (around 5/3/2017).      [facility]     [facility] On Call Nurse [facility] -  Assistance  Registered nurses in the [facility] On Call Center provide clinical advisement, health education, appointment booking, and other advisory services.  Call for this free service at [phone].             Medications           Message regarding Medications     Verify the changes and/or additions to your medication regime listed below are the same as discussed with your clinician today.  If any of these changes or additions are incorrect, please notify your healthcare provider.        STOP taking these medications     morphine (MS CONTIN) 15 MG 12 hr tablet Take 1-2 tablets every 8 hrs as needed for pain.    oxycodone (ROXICODONE) 5 MG immediate release tablet Take 1 tablet every 3-6 hrs as needed for breakthrough pain until done with MS Contin, then 1-2 Q 4-8 hrs as needed for pain.    promethazine (PHENERGAN) 25 MG tablet Take 1 tablet (25 mg total) by mouth every 6 (six) hours as needed for Nausea.           Verify that the below list of medications is an accurate representation of the medications you are currently taking.  If none reported, the list may be  blank. If incorrect, please contact your healthcare provider. Carry this list with you in case of emergency.           Current Medications     meloxicam (MOBIC) 15 MG tablet Take one tab after breakfast and half of a tab after lunch every day.    MICROGESTIN FE 1/20, 28, 1 mg-20 mcg (21)/75 mg (7) per tablet            Clinical Reference Information           Your Vitals Were     BP Pulse Resp             137/98 76 12         Blood Pressure          Most Recent Value    BP  (!)  137/98      Allergies as of 3/8/2017     No Known Allergies      Immunizations Administered on Date of Encounter - 3/8/2017     None      Language Assistance Services     ATTENTION: Language assistance services are available, free of charge. Please call [phone].      ATENCIÓN: Si kristoferla hector, tiene a vaca disposición servicios gratuitos de asistencia lingüística. Llame al [phone].     CHÚ Ý: N?u b?n nói Ti?ng Vi?t, có các d?ch v? h? tr? ngôn ng? mi?n phí dành cho b?n. G?i s? [phone].         [facility] complies with applicable Federal civil rights laws and does not discriminate on the basis of race, color, national origin, age, disability, or sex.

## 2017-03-08 NOTE — PROGRESS NOTES
Subjective:      Patient ID: Brian Moss is a 32 y.o. female.    Chief Complaint: Post-op Evaluation of the Left Knee    HPI    The patient is seen back status post anterior cruciate ligament reconstruction and arthroscopy of the left knee. She currently does not complain of any pain.   Patient is no longer attending PT. She states she stopped going and is doing at-home exercises.       Review of Systems   Constitution: Negative for chills and fever.   Gastrointestinal: Negative for abdominal pain, diarrhea, nausea and vomiting.         Objective:            Ortho/SPM Exam  Incisions are clean, dry and intact. ROM: +2 to 110. No pain or tenderness on exam. The patient walks with a fairly fluid gait. Her repair is very stable. No laxity or instability on exam.           Assessment:       Encounter Diagnoses   Name Primary?    S/P ACL repair Yes    Status post arthroscopy of left knee     Postop check           Plan:       Brian was seen today for post-op evaluation.    Diagnoses and all orders for this visit:    S/P ACL repair    Status post arthroscopy of left knee    Postop check    The patient is doing well considering that she is close to being 3 months out from her surgery date. She will continue with at-home exercises since she does not wish to return to outpatient PT. The patient wishes to return to full duty work on 3/20/17. She is a  and her job includes climbing and burying cables.  I gave her a note indicating that she can do so.     The patient will return for a follow-up in 8 weeks for her left knee.

## 2017-03-08 NOTE — LETTER
March 8, 2017      [facility]  [address] [place] [address]  Phone: [phone]  Fax: [phone]       Patient: ZORAIDA Moss   YOB: 1985  Date of Visit: 03/08/2017    To Whom It May Concern:    ZORAIDA was at [facility] on 03/08/2017. She may return to work on 3/20/17 with no restrictions. If you have any questions or concerns, or if I can be of further assistance, please do not hesitate to contact me.    Sincerely,        Lenore Hendricks PA-C

## 2017-03-16 ENCOUNTER — OFFICE VISIT (OUTPATIENT)
Dept: INTERNAL MEDICINE | Facility: CLINIC | Age: 32
End: 2017-03-16
Payer: COMMERCIAL

## 2017-03-16 VITALS
WEIGHT: 248.69 LBS | TEMPERATURE: 97 F | OXYGEN SATURATION: 99 % | BODY MASS INDEX: 34.81 KG/M2 | SYSTOLIC BLOOD PRESSURE: 124 MMHG | HEIGHT: 71 IN | HEART RATE: 74 BPM | DIASTOLIC BLOOD PRESSURE: 86 MMHG

## 2017-03-16 DIAGNOSIS — I10 ESSENTIAL HYPERTENSION: Primary | ICD-10-CM

## 2017-03-16 PROCEDURE — 99999 PR PBB SHADOW E&M-EST. PATIENT-LVL III: CPT | Mod: PBBFAC,,, | Performed by: FAMILY MEDICINE

## 2017-03-16 PROCEDURE — 1160F RVW MEDS BY RX/DR IN RCRD: CPT | Mod: S$GLB,,, | Performed by: FAMILY MEDICINE

## 2017-03-16 PROCEDURE — 3079F DIAST BP 80-89 MM HG: CPT | Mod: S$GLB,,, | Performed by: FAMILY MEDICINE

## 2017-03-16 PROCEDURE — 3074F SYST BP LT 130 MM HG: CPT | Mod: S$GLB,,, | Performed by: FAMILY MEDICINE

## 2017-03-16 PROCEDURE — 99213 OFFICE O/P EST LOW 20 MIN: CPT | Mod: S$GLB,,, | Performed by: FAMILY MEDICINE

## 2017-03-16 NOTE — PROGRESS NOTES
Subjective:       Patient ID: Brian Moss is a 32 y.o. female.    Chief Complaint: Hypertension     HPI Hypertension (patients blood pressure was elevated in the dentist office 130s/90s); in past d/e controlled;knows about dash diet;now off mobic    Past Medical History:   Diagnosis Date    Angioedema     H. pylori infection     Hypertension     diet controlled     Past Surgical History:   Procedure Laterality Date     SECTION      x 2 12, 14    DILATION AND CURETTAGE OF UTERUS      KNEE ARTHROSCOPY Left 2016     Family History   Problem Relation Age of Onset    Diabetes Mother     Hypertension Mother     Diabetes Father     Diabetes Brother     Hypertension Brother      Social History     Social History    Marital status:      Spouse name: N/A    Number of children: 2    Years of education: N/A     Social History Main Topics    Smoking status: Never Smoker    Smokeless tobacco: Never Used    Alcohol use No    Drug use: No    Sexual activity: Yes     Partners: Male     Birth control/ protection: IUD     Other Topics Concern    None     Social History Narrative       Review of Systems  no cp osb  Objective:    fatemeh by me 128/88  Physical Exam  cvrrr  Assessment:       1. Essential hypertension        Plan:       *has annual f/u decem**    Monitor bps 2-3x week one month; notify elev  D/wd suspect elev bp situational  Dash diet  Daily exerc

## 2017-03-16 NOTE — MR AVS SNAPSHOT
"    [facility]  [address] [facility] [place] [address]  Phone: [phone]  Fax: [phone]                  Brian Moss   3/16/2017 7:40 AM   Office Visit    Description:  Female : 1985   Provider:  Pete Wallace MD   Department:  [facility]           Reason for Visit     Hypertension           Diagnoses this Visit        Comments    Essential hypertension    -  Primary            To Do List           Future Appointments        Provider Department Dept Phone    5/3/2017 8:30 AM [facility] [phone]      Goals (5 Years of Data)     None      [facility]     [facility] -  Assistance  Registered nurses in the [facility] On Call Center provide clinical advisement, health education, appointment booking, and other advisory services.  Call for this free service at [phone].             Medications           Message regarding Medications     Verify the changes and/or additions to your medication regime listed below are the same as discussed with your clinician today.  If any of these changes or additions are incorrect, please notify your healthcare provider.        STOP taking these medications     meloxicam (MOBIC) 15 MG tablet Take one tab after breakfast and half of a tab after lunch every day.           Verify that the below list of medications is an accurate representation of the medications you are currently taking.  If none reported, the list may be blank. If incorrect, please contact your healthcare provider. Carry this list with you in case of emergency.           Current Medications     MICROGESTIN FE /, 28, 1 mg-20 mcg (21)/75 mg (7) per tablet            Clinical Reference Information           Your Vitals Were     BP Pulse Temp Height Weight Last Period    124/86 (BP Location: Right arm, Patient Position: Sitting) 74 97.2 °F (36.2 °C) (Tympanic) 5' 11" (1.803 m) 112.8 kg (248 lb 10.9 oz) " 03/08/2017 (Exact Date)    SpO2 BMI             99% 34.68 kg/m2         Blood Pressure          Most Recent Value    BP  124/86      Allergies as of 3/16/2017     No Known Allergies      Immunizations Administered on Date of Encounter - 3/16/2017     None      Language Assistance Services     ATTENTION: Language assistance services are available, free of charge. Please call [phone].      ATENCIÓN: Si habla español, tiene a vaca disposición servicios gratuitos de asistencia lingüística. Llame al [phone].     CHÚ Ý: N?u b?n nói Ti?ng Vi?t, có các d?ch v? h? tr? ngôn ng? mi?n phí dành cho b?n. G?i s? [phone].         [facility] complies with applicable Federal civil rights laws and does not discriminate on the basis of race, color, national origin, age, disability, or sex.

## 2017-03-17 ENCOUNTER — TELEPHONE (OUTPATIENT)
Dept: ORTHOPEDICS | Facility: CLINIC | Age: 32
End: 2017-03-17

## 2017-03-17 NOTE — TELEPHONE ENCOUNTER
Spoke to pt informed her [employer] is requesting a signed letter stating she can return to work but at this time the provider is out of office. It will be next week before we can get the letter signed and faxed. Pt verbalized she understood. Stated she couldn't send the one she received due to [employer] not accepting it.

## 2017-03-17 NOTE — TELEPHONE ENCOUNTER
----- Message from Fernanda Castro sent at 3/17/2017  2:04 PM CDT -----  Contact: Reed Smith called and stated he needed to speak to the nurse. He stated that the pt needs a return to work slip faxed to [phone] including the claim number [id number]. He can be reached at [phone].    Thanks,  TF

## 2017-05-08 PROBLEM — Z09 POSTOP CHECK: Status: RESOLVED | Noted: 2017-02-01 | Resolved: 2017-05-08

## 2024-05-20 ENCOUNTER — OFFICE VISIT (OUTPATIENT)
Dept: INTERNAL MEDICINE | Facility: CLINIC | Age: 39
End: 2024-05-20
Payer: COMMERCIAL

## 2024-05-20 ENCOUNTER — HOSPITAL ENCOUNTER (OUTPATIENT)
Dept: CARDIOLOGY | Facility: HOSPITAL | Age: 39
Discharge: HOME | End: 2024-05-20
Attending: PHYSICIAN ASSISTANT
Payer: COMMERCIAL

## 2024-05-20 ENCOUNTER — TELEPHONE (OUTPATIENT)
Dept: INTERNAL MEDICINE | Facility: CLINIC | Age: 39
End: 2024-05-20

## 2024-05-20 VITALS
HEIGHT: 71 IN | OXYGEN SATURATION: 99 % | WEIGHT: 273.81 LBS | BODY MASS INDEX: 38.33 KG/M2 | SYSTOLIC BLOOD PRESSURE: 174 MMHG | HEART RATE: 75 BPM | DIASTOLIC BLOOD PRESSURE: 110 MMHG

## 2024-05-20 DIAGNOSIS — I10 PRIMARY HYPERTENSION: ICD-10-CM

## 2024-05-20 DIAGNOSIS — I10 PRIMARY HYPERTENSION: Primary | ICD-10-CM

## 2024-05-20 DIAGNOSIS — Z86.19 HISTORY OF HELICOBACTER PYLORI INFECTION: ICD-10-CM

## 2024-05-20 DIAGNOSIS — Z11.4 SCREENING FOR HIV (HUMAN IMMUNODEFICIENCY VIRUS): ICD-10-CM

## 2024-05-20 DIAGNOSIS — Z11.59 ENCOUNTER FOR HEPATITIS C SCREENING TEST FOR LOW RISK PATIENT: ICD-10-CM

## 2024-05-20 DIAGNOSIS — Z00.00 ROUTINE HEALTH MAINTENANCE: ICD-10-CM

## 2024-05-20 LAB
OHS QRS DURATION: 80 MS
OHS QTC CALCULATION: 415 MS

## 2024-05-20 PROCEDURE — 1159F MED LIST DOCD IN RCRD: CPT | Mod: CPTII,S$GLB,, | Performed by: PHYSICIAN ASSISTANT

## 2024-05-20 PROCEDURE — 93010 ELECTROCARDIOGRAM REPORT: CPT | Mod: ,,, | Performed by: INTERNAL MEDICINE

## 2024-05-20 PROCEDURE — 3008F BODY MASS INDEX DOCD: CPT | Mod: CPTII,S$GLB,, | Performed by: PHYSICIAN ASSISTANT

## 2024-05-20 PROCEDURE — 1160F RVW MEDS BY RX/DR IN RCRD: CPT | Mod: CPTII,S$GLB,, | Performed by: PHYSICIAN ASSISTANT

## 2024-05-20 PROCEDURE — 93005 ELECTROCARDIOGRAM TRACING: CPT

## 2024-05-20 PROCEDURE — 3077F SYST BP >= 140 MM HG: CPT | Mod: CPTII,S$GLB,, | Performed by: PHYSICIAN ASSISTANT

## 2024-05-20 PROCEDURE — 99204 OFFICE O/P NEW MOD 45 MIN: CPT | Mod: S$GLB,,, | Performed by: PHYSICIAN ASSISTANT

## 2024-05-20 PROCEDURE — 99999 PR PBB SHADOW E&M-EST. PATIENT-LVL III: CPT | Mod: PBBFAC,,, | Performed by: PHYSICIAN ASSISTANT

## 2024-05-20 PROCEDURE — 3080F DIAST BP >= 90 MM HG: CPT | Mod: CPTII,S$GLB,, | Performed by: PHYSICIAN ASSISTANT

## 2024-05-20 RX ORDER — AMLODIPINE BESYLATE 5 MG/1
5 TABLET ORAL DAILY
Qty: 30 TABLET | Refills: 11 | Status: SHIPPED | OUTPATIENT
Start: 2024-05-20 | End: 2025-05-20

## 2024-05-20 NOTE — PROGRESS NOTES
Subjective:      Patient ID: Brian Moss is a 39 y.o. female.    Chief Complaint: Hypertension (Pt present today with c/o elevated BP ranging from 138-170/ )      Here today for evaluation of high blood pressure. Has never been on blood pressure medications in the past. +fam hx of htn. No heart attack or stroke she is aware of in her fam hx.   Lost to follow up due to 3 pregnancies.     Hypertension  This is a new problem. The current episode started 1 to 4 weeks ago. The problem has been gradually worsening since onset. The problem is uncontrolled. Associated symptoms include anxiety, blurred vision, headaches and malaise/fatigue. Pertinent negatives include no chest pain, neck pain, orthopnea, palpitations, peripheral edema, PND, shortness of breath or sweats. Risk factors for coronary artery disease include obesity and sedentary lifestyle. Past treatments include nothing.     Admits to severe GERD with certain foods 1-2 times a week. Takes otc nexium with usually give her some relief. Pt admits to a history of H. Pylori. Pt admits that she started the treatment but did not finish.     Patient Active Problem List   Diagnosis    Derangement of left knee    Chondromalacia of left patella    Ligamentous laxity of left knee    Hypertension    Old complete ACL tear    Acute lateral meniscus tear of left knee    Synovitis of left knee    Status post arthroscopy of left knee    S/P ACL repair         Current Outpatient Medications:     amLODIPine (NORVASC) 5 MG tablet, Take 1 tablet (5 mg total) by mouth once daily., Disp: 30 tablet, Rfl: 11    drospirenone-estetrol (NEXTSTELLIS) 3 mg- 14.2 mg (28) Tab, Take 1 tablet by mouth Daily. (Patient not taking: Reported on 5/20/2024), Disp: 90 tablet, Rfl: 3    Review of Systems   Constitutional:  Positive for malaise/fatigue. Negative for activity change, appetite change, chills, diaphoresis, fatigue, fever and unexpected weight change.   HENT: Negative.  Negative  "for congestion, hearing loss, postnasal drip, rhinorrhea, sore throat, trouble swallowing and voice change.    Eyes:  Positive for blurred vision. Negative for visual disturbance.   Respiratory: Negative.  Negative for cough, choking, chest tightness and shortness of breath.    Cardiovascular:  Negative for chest pain, palpitations, orthopnea, leg swelling and PND.   Gastrointestinal:  Positive for abdominal pain (GERD). Negative for abdominal distention, blood in stool, constipation, diarrhea, nausea and vomiting.   Endocrine: Negative for cold intolerance, heat intolerance, polydipsia and polyuria.   Genitourinary: Negative.  Negative for difficulty urinating and frequency.   Musculoskeletal:  Negative for arthralgias, back pain, gait problem, joint swelling, myalgias and neck pain.   Skin:  Negative for color change, pallor, rash and wound.   Neurological:  Positive for headaches. Negative for dizziness, tremors, weakness, light-headedness and numbness.   Hematological:  Negative for adenopathy.   Psychiatric/Behavioral:  Negative for behavioral problems, confusion, self-injury, sleep disturbance and suicidal ideas. The patient is not nervous/anxious.      Objective:   BP (!) 174/110 (BP Location: Left arm, Patient Position: Sitting, BP Method: Large (Manual))   Pulse 75   Ht 5' 11" (1.803 m)   Wt 124.2 kg (273 lb 13 oz)   SpO2 99%   BMI 38.19 kg/m²     Physical Exam  Vitals and nursing note reviewed.   Constitutional:       General: She is not in acute distress.     Appearance: Normal appearance. She is well-developed. She is not ill-appearing, toxic-appearing or diaphoretic.   HENT:      Head: Normocephalic and atraumatic.   Cardiovascular:      Rate and Rhythm: Normal rate and regular rhythm.      Heart sounds: Normal heart sounds. No murmur heard.     No friction rub. No gallop.   Pulmonary:      Effort: Pulmonary effort is normal. No respiratory distress.      Breath sounds: Normal breath sounds. No " wheezing or rales.   Musculoskeletal:         General: Normal range of motion.   Skin:     General: Skin is warm.      Capillary Refill: Capillary refill takes less than 2 seconds.      Findings: No rash.   Neurological:      Mental Status: She is alert and oriented to person, place, and time.      Motor: No weakness.      Gait: Gait normal.   Psychiatric:         Mood and Affect: Mood normal.         Behavior: Behavior normal.         Thought Content: Thought content normal.         Judgment: Judgment normal.         Assessment:     1. Primary hypertension    2. Routine health maintenance    3. Encounter for hepatitis C screening test for low risk patient    4. Screening for HIV (human immunodeficiency virus)    5. History of Helicobacter pylori infection      Plan:   Primary hypertension  -     CBC Auto Differential; Future; Expected date: 05/20/2024  -     Comprehensive Metabolic Panel; Future; Expected date: 05/20/2024  -     Lipid Panel; Future; Expected date: 05/20/2024  -     TSH; Future  -     EKG 12-lead; Future  -     Hemoglobin A1C; Future; Expected date: 05/20/2024  -     amLODIPine (NORVASC) 5 MG tablet; Take 1 tablet (5 mg total) by mouth once daily.  Dispense: 30 tablet; Refill: 11    Routine health maintenance  -     CBC Auto Differential; Future; Expected date: 05/20/2024  -     Comprehensive Metabolic Panel; Future; Expected date: 05/20/2024  -     Lipid Panel; Future; Expected date: 05/20/2024  -     Hemoglobin A1C; Future; Expected date: 05/20/2024    Encounter for hepatitis C screening test for low risk patient  -     Hepatitis C Antibody; Future; Expected date: 05/20/2024    Screening for HIV (human immunodeficiency virus)  -     HIV 1/2 Ag/Ab (4th Gen); Future; Expected date: 05/20/2024    History of Helicobacter pylori infection  -     H. pylori antigen, stool; Future; Expected date: 05/20/2024    -handout on how to check bp at home printed out for her today  -handout on lifestyle changes to  manage bp printed for her  -follow up in 2 weeks to recheck bp on amlodipine.     Follow up in about 2 weeks (around 6/3/2024), or if symptoms worsen or fail to improve.

## 2024-05-21 ENCOUNTER — PATIENT MESSAGE (OUTPATIENT)
Dept: GASTROENTEROLOGY | Facility: CLINIC | Age: 39
End: 2024-05-21
Payer: COMMERCIAL

## 2024-05-21 DIAGNOSIS — A04.8 H. PYLORI INFECTION: Primary | ICD-10-CM

## 2024-05-21 DIAGNOSIS — D62 ACUTE BLOOD LOSS ANEMIA: ICD-10-CM

## 2024-05-21 DIAGNOSIS — K21.9 GASTROESOPHAGEAL REFLUX DISEASE, UNSPECIFIED WHETHER ESOPHAGITIS PRESENT: ICD-10-CM

## 2024-05-21 RX ORDER — FERROUS SULFATE 325(65) MG
325 TABLET ORAL 2 TIMES DAILY
Qty: 60 TABLET | Refills: 3 | Status: SHIPPED | OUTPATIENT
Start: 2024-05-21 | End: 2024-09-18

## 2024-05-22 ENCOUNTER — TELEPHONE (OUTPATIENT)
Dept: HEMATOLOGY/ONCOLOGY | Facility: CLINIC | Age: 39
End: 2024-05-22
Payer: COMMERCIAL

## 2024-05-22 ENCOUNTER — HOSPITAL ENCOUNTER (OUTPATIENT)
Dept: PREADMISSION TESTING | Facility: HOSPITAL | Age: 39
Discharge: HOME | End: 2024-05-22
Attending: FAMILY MEDICINE
Payer: COMMERCIAL

## 2024-05-22 DIAGNOSIS — K21.9 GASTROESOPHAGEAL REFLUX DISEASE, UNSPECIFIED WHETHER ESOPHAGITIS PRESENT: ICD-10-CM

## 2024-05-22 DIAGNOSIS — A04.8 H. PYLORI INFECTION: ICD-10-CM

## 2024-05-22 DIAGNOSIS — D62 ACUTE BLOOD LOSS ANEMIA: ICD-10-CM

## 2024-05-22 DIAGNOSIS — D64.9 ANEMIA: Primary | ICD-10-CM

## 2024-05-23 ENCOUNTER — LAB VISIT (OUTPATIENT)
Dept: LAB | Facility: HOSPITAL | Age: 39
End: 2024-05-23
Attending: INTERNAL MEDICINE
Payer: COMMERCIAL

## 2024-05-23 DIAGNOSIS — D64.9 ANEMIA: ICD-10-CM

## 2024-05-23 LAB
FERRITIN SERPL-MCNC: 9 NG/ML (ref 20–300)
IRON SERPL-MCNC: 27 UG/DL (ref 30–160)
SATURATED IRON: 5 % (ref 20–50)
TOTAL IRON BINDING CAPACITY: 562 UG/DL (ref 250–450)
TRANSFERRIN SERPL-MCNC: 380 MG/DL (ref 200–375)

## 2024-05-23 PROCEDURE — 82728 ASSAY OF FERRITIN: CPT | Performed by: INTERNAL MEDICINE

## 2024-05-23 PROCEDURE — 36415 COLL VENOUS BLD VENIPUNCTURE: CPT | Mod: PN | Performed by: INTERNAL MEDICINE

## 2024-05-23 PROCEDURE — 83540 ASSAY OF IRON: CPT | Performed by: INTERNAL MEDICINE

## 2024-05-24 ENCOUNTER — TELEPHONE (OUTPATIENT)
Dept: HEMATOLOGY/ONCOLOGY | Facility: CLINIC | Age: 39
End: 2024-05-24
Payer: COMMERCIAL

## 2024-05-24 NOTE — TELEPHONE ENCOUNTER
Lvm reminding pt of vv on 5/21/24 at 320 pm   I asked pt to call office if she needs to reschedule

## 2024-05-31 ENCOUNTER — OFFICE VISIT (OUTPATIENT)
Dept: HEMATOLOGY/ONCOLOGY | Facility: CLINIC | Age: 39
End: 2024-05-31
Payer: COMMERCIAL

## 2024-05-31 DIAGNOSIS — D62 ACUTE BLOOD LOSS ANEMIA: ICD-10-CM

## 2024-05-31 DIAGNOSIS — D50.0 IRON DEFICIENCY ANEMIA DUE TO CHRONIC BLOOD LOSS: Primary | ICD-10-CM

## 2024-05-31 PROCEDURE — 3044F HG A1C LEVEL LT 7.0%: CPT | Mod: CPTII,95,, | Performed by: INTERNAL MEDICINE

## 2024-05-31 PROCEDURE — 99204 OFFICE O/P NEW MOD 45 MIN: CPT | Mod: 95,,, | Performed by: INTERNAL MEDICINE

## 2024-05-31 RX ORDER — SODIUM CHLORIDE 0.9 % (FLUSH) 0.9 %
10 SYRINGE (ML) INJECTION
Status: CANCELLED | OUTPATIENT
Start: 2024-05-31

## 2024-05-31 RX ORDER — DIPHENHYDRAMINE HYDROCHLORIDE 50 MG/ML
50 INJECTION INTRAMUSCULAR; INTRAVENOUS ONCE AS NEEDED
Status: CANCELLED | OUTPATIENT
Start: 2024-05-31

## 2024-05-31 RX ORDER — EPINEPHRINE 0.3 MG/.3ML
0.3 INJECTION SUBCUTANEOUS ONCE AS NEEDED
Status: CANCELLED | OUTPATIENT
Start: 2024-05-31

## 2024-05-31 RX ORDER — HEPARIN 100 UNIT/ML
5 SYRINGE INTRAVENOUS
Status: CANCELLED | OUTPATIENT
Start: 2024-05-31

## 2024-05-31 RX ORDER — SODIUM CHLORIDE 9 MG/ML
INJECTION, SOLUTION INTRAVENOUS CONTINUOUS
Status: CANCELLED | OUTPATIENT
Start: 2024-05-31

## 2024-05-31 NOTE — PROGRESS NOTES
The patient location is: home  Visit type: Virtual visit with synchronous audio and video  Face-to-face or time spent with patient on the encounter: 25 min  Total time spent on and for  this encounter which includes non face-to-face time preparing to see patient, review of tests, obtaining and or reviewing separately obtained records documenting clinical information in the electronic or other health records, independently interpreting results which is not separately reported ,and communicating results to the patient/family/caregiver and in care coordination and treatment planning/communicating with pharmacy for prescriptions/addressing social needs/arranging follow-up and or referrals : 25 min     Each patient I provide medical services by telemedicine is:  (1) informed of the relationship between the physician and patient and the respective role of any other health care provider with respect to management of the patient; and (2) notified that he or she may decline to receive medical services by telemedicine and may withdraw from such care at any time.  This is a video visit therefore some elements of the physical exam such as vital signs, heart sounds are breath sounds are not included and may be included if found in recent clinic notes of other providers assessing same patient. Any symptoms or signs that were visualized were stated by the patient may be included in this note.      Service Date:  5/31/24    Chief Complaint: iron deficiency anemia    Brian Moss is a 39 y.o. female here with iron deficiency anemia.  Secondary to menorrhagia.  Having fatigue.  On oral iron but patient is very anemic.  No relief so far.    Review of Systems   Constitutional: Negative.  Negative for appetite change and unexpected weight change.   HENT: Negative.  Negative for mouth sores.    Eyes: Negative.  Negative for visual disturbance.   Respiratory: Negative.  Negative for cough and shortness of breath.    Cardiovascular:  Negative.  Negative for chest pain.   Gastrointestinal: Negative.  Negative for abdominal pain and diarrhea.   Endocrine: Negative.    Genitourinary: Negative.  Negative for frequency.   Musculoskeletal: Negative.  Negative for back pain.   Integumentary:  Negative for rash. Negative.   Neurological: Negative.  Negative for headaches.   Hematological: Negative.  Negative for adenopathy.   Psychiatric/Behavioral: Negative.  The patient is not nervous/anxious.         Current Outpatient Medications   Medication Instructions    amLODIPine (NORVASC) 5 mg, Oral, Daily    ferrous sulfate (FEOSOL) 325 mg, Oral, 2 times daily        Past Medical History:   Diagnosis Date    Angioedema     H. pylori infection     Hypertension     diet controlled        Past Surgical History:   Procedure Laterality Date    BILATERAL TUBAL LIGATION       SECTION      x 2 12, 14    DILATION AND CURETTAGE OF UTERUS      KNEE ARTHROSCOPY Left 2016        Family History   Problem Relation Name Age of Onset    Diabetes Mother      Hypertension Mother      Diabetes Father      Diabetes Brother      Hypertension Brother         Social History     Tobacco Use    Smoking status: Never    Smokeless tobacco: Never   Substance Use Topics    Alcohol use: No    Drug use: No         There were no vitals filed for this visit.     Physical Exam:  There were no vitals taken for this visit.    Physical Exam  Constitutional:       Appearance: Normal appearance.   HENT:      Head: Normocephalic and atraumatic.      Nose: Nose normal.      Mouth/Throat:      Mouth: Mucous membranes are moist.      Pharynx: Oropharynx is clear.   Eyes:      Conjunctiva/sclera: Conjunctivae normal.   Cardiovascular:      Rate and Rhythm: Normal rate and regular rhythm.      Heart sounds: Normal heart sounds.   Pulmonary:      Effort: Pulmonary effort is normal.      Breath sounds: Normal breath sounds.   Abdominal:      General: Abdomen is flat. Bowel sounds  are normal.      Palpations: Abdomen is soft.   Musculoskeletal:         General: Normal range of motion.      Cervical back: Normal range of motion and neck supple.   Skin:     General: Skin is warm and dry.   Neurological:      General: No focal deficit present.      Mental Status: She is alert and oriented to person, place, and time. Mental status is at baseline.   Psychiatric:         Mood and Affect: Mood normal.          Labs:  Lab Results   Component Value Date    WBC 3.02 (L) 05/20/2024    RBC 3.65 (L) 05/20/2024    HGB 7.3 (L) 05/20/2024    HCT 25.8 (L) 05/20/2024    MCV 71 (L) 05/20/2024    MCH 20.0 (L) 05/20/2024    MCHC 28.3 (L) 05/20/2024    RDW 21.0 (H) 05/20/2024     05/20/2024    MPV 10.1 05/20/2024    GRAN 1.1 (L) 05/20/2024    GRAN 37.8 (L) 05/20/2024    LYMPH 1.7 05/20/2024    LYMPH 56.0 (H) 05/20/2024    MONO 0.2 (L) 05/20/2024    MONO 5.6 05/20/2024    EOS 0.0 05/20/2024    BASO 0.01 05/20/2024    EOSINOPHIL 0.3 05/20/2024    BASOPHIL 0.3 05/20/2024     Sodium   Date Value Ref Range Status   05/20/2024 138 136 - 145 mmol/L Final     Potassium   Date Value Ref Range Status   05/20/2024 3.8 3.5 - 5.1 mmol/L Final     Chloride   Date Value Ref Range Status   05/20/2024 109 95 - 110 mmol/L Final     CO2   Date Value Ref Range Status   05/20/2024 23 23 - 29 mmol/L Final     Glucose   Date Value Ref Range Status   05/20/2024 97 70 - 110 mg/dL Final     BUN   Date Value Ref Range Status   05/20/2024 11 6 - 20 mg/dL Final     Creatinine   Date Value Ref Range Status   05/20/2024 0.9 0.5 - 1.4 mg/dL Final     Calcium   Date Value Ref Range Status   05/20/2024 8.7 8.7 - 10.5 mg/dL Final     Total Protein   Date Value Ref Range Status   05/20/2024 7.4 6.0 - 8.4 g/dL Final     Albumin   Date Value Ref Range Status   05/20/2024 3.5 3.5 - 5.2 g/dL Final     Total Bilirubin   Date Value Ref Range Status   05/20/2024 0.5 0.1 - 1.0 mg/dL Final     Comment:     For infants and newborns, interpretation of  results should be based  on gestational age, weight and in agreement with clinical  observations.    Premature Infant recommended reference ranges:  Up to 24 hours.............<8.0 mg/dL  Up to 48 hours............<12.0 mg/dL  3-5 days..................<15.0 mg/dL  6-29 days.................<15.0 mg/dL       Alkaline Phosphatase   Date Value Ref Range Status   05/20/2024 77 55 - 135 U/L Final     AST   Date Value Ref Range Status   05/20/2024 25 10 - 40 U/L Final     ALT   Date Value Ref Range Status   05/20/2024 19 10 - 44 U/L Final     Anion Gap   Date Value Ref Range Status   05/20/2024 6 (L) 8 - 16 mmol/L Final     eGFR if    Date Value Ref Range Status   12/01/2016 >60.0 >60 mL/min/1.73 m^2 Final     eGFR if non    Date Value Ref Range Status   12/01/2016 >60.0 >60 mL/min/1.73 m^2 Final     Comment:     Calculation used to obtain the estimated glomerular filtration  rate (eGFR) is the CKD-EPI equation. Since race is unknown   in our information system, the eGFR values for   -American and Non--American patients are given   for each creatinine result.         A/P:    Iron deficiency anemia  -no relief with oral iron  -we will get authorization for 2 doses of IV Injectafer   -return to clinic 4 weeks for now with labs      Aurash Khoobehi, MD  Hematology and Oncology

## 2024-06-05 ENCOUNTER — OFFICE VISIT (OUTPATIENT)
Dept: GASTROENTEROLOGY | Facility: CLINIC | Age: 39
End: 2024-06-05
Payer: COMMERCIAL

## 2024-06-05 DIAGNOSIS — D50.9 IRON DEFICIENCY ANEMIA, UNSPECIFIED IRON DEFICIENCY ANEMIA TYPE: Primary | ICD-10-CM

## 2024-06-05 DIAGNOSIS — N92.1 MENORRHAGIA WITH IRREGULAR CYCLE: ICD-10-CM

## 2024-06-05 DIAGNOSIS — K21.9 GASTROESOPHAGEAL REFLUX DISEASE, UNSPECIFIED WHETHER ESOPHAGITIS PRESENT: ICD-10-CM

## 2024-06-05 PROCEDURE — 99204 OFFICE O/P NEW MOD 45 MIN: CPT | Mod: 95,,, | Performed by: NURSE PRACTITIONER

## 2024-06-05 PROCEDURE — 1159F MED LIST DOCD IN RCRD: CPT | Mod: CPTII,95,, | Performed by: NURSE PRACTITIONER

## 2024-06-05 PROCEDURE — 1160F RVW MEDS BY RX/DR IN RCRD: CPT | Mod: CPTII,95,, | Performed by: NURSE PRACTITIONER

## 2024-06-05 PROCEDURE — 3044F HG A1C LEVEL LT 7.0%: CPT | Mod: CPTII,95,, | Performed by: NURSE PRACTITIONER

## 2024-06-05 RX ORDER — OMEPRAZOLE 20 MG/1
20 CAPSULE, DELAYED RELEASE ORAL DAILY
Qty: 90 CAPSULE | Refills: 3 | Status: SHIPPED | OUTPATIENT
Start: 2024-06-05 | End: 2025-06-05

## 2024-06-05 RX ORDER — SODIUM, POTASSIUM,MAG SULFATES 17.5-3.13G
SOLUTION, RECONSTITUTED, ORAL ORAL
Qty: 354 ML | Refills: 0 | Status: SHIPPED | OUTPATIENT
Start: 2024-06-05

## 2024-06-05 NOTE — PROGRESS NOTES
Clinic Consult:  [facility] Consultation Note    Reason for Consult:  The primary encounter diagnosis was Iron deficiency anemia, unspecified iron deficiency anemia type. Diagnoses of Gastroesophageal reflux disease, unspecified whether esophagitis present and Menorrhagia with irregular cycle were also pertinent to this visit.    PCP: Pete Wallace   [address] / [place] [facility] [address]    HPI:  This is a 39 y.o. female here for evaluation of anemia.   Onset: noted on recent labs but has had it before in the past ()  Type of anemia: iron deficiency   Last Hgb: 7.3  Iron studies: serum iron 27; TIBC 562; saturated iron 5; ferritin 9  Blood thinners:no  Any overt GI bleeding: no  Does have heavy menstrual cycles. Saw OB/GYN for this and was put on birth control but was unable to tolerate it. They also discussed potential ablation.   EGD/Colonoscopy done: none    She does report a history of H. Pylor in the past ().   She does have chronic GERD. She reports symptoms a couple of times per week and takes Nexium OTC PRN. Symptoms of GERD include burning chest pain and regurgitation.     She denies any family history of colon cancer.       Review of Systems   Constitutional:  Negative for fever and weight loss.   HENT:  Negative for sore throat.    Respiratory:  Negative for cough, shortness of breath and wheezing.    Cardiovascular:  Negative for chest pain and palpitations.   Gastrointestinal:  Positive for heartburn. Negative for abdominal pain, blood in stool, constipation, diarrhea, melena, nausea and vomiting.   Skin:  Negative for itching and rash.       Medical History:  has a past medical history of Angioedema, H. pylori infection, and Hypertension.    Surgical History:  has a past surgical history that includes  section; Dilation and curettage of uterus; Knee arthroscopy (Left, 2016); and Bilateral tubal ligation.    Family History: family history includes  Diabetes in her brother, father, and mother; Hypertension in her brother and mother..     Social History:  reports that she has never smoked. She has never used smokeless tobacco. She reports that she does not drink alcohol and does not use drugs.    Allergies: Reviewed    Home Medications:   Current Outpatient Medications on File Prior to Visit   Medication Sig Dispense Refill    amLODIPine (NORVASC) 5 MG tablet Take 1 tablet (5 mg total) by mouth once daily. 30 tablet 11    ferrous sulfate (FEOSOL) 325 mg (65 mg iron) Tab tablet Take 1 tablet (325 mg total) by mouth 2 (two) times daily. 60 tablet 3    [DISCONTINUED] drospirenone-estetrol (NEXTSTELLIS) 3 mg- 14.2 mg (28) Tab Take 1 tablet by mouth Daily. (Patient not taking: Reported on 5/20/2024) 90 tablet 3     No current facility-administered medications on file prior to visit.       Physical Exam:  There were no vitals taken for this visit.  There is no height or weight on file to calculate BMI.  Physical Exam  Constitutional:       General: She is not in acute distress.  HENT:      Head: Normocephalic.   Neurological:      General: No focal deficit present.      Mental Status: She is alert.   Psychiatric:         Mood and Affect: Mood normal.         Judgment: Judgment normal.         Labs: Pertinent labs reviewed.  CRC Screening: NA    Assessment:  1. Iron deficiency anemia, unspecified iron deficiency anemia type    2. Gastroesophageal reflux disease, unspecified whether esophagitis present    3. Menorrhagia with irregular cycle    Heavy menstrual cycle is likely contributing to anemia. She does have significant anemia.     Recommendations:   - EGD and colonoscopy to rule out GI cause of anemia.   - start low dose PPI every day    Iron deficiency anemia, unspecified iron deficiency anemia type  -     Ambulatory referral/consult to Endo Procedure   -     sodium,potassium,mag sulfates (SUPREP BOWEL PREP KIT) 17.5-3.13-1.6 gram SolR; Use as directed   Dispense: 354 mL; Refill: 0    Gastroesophageal reflux disease, unspecified whether esophagitis present  -     Ambulatory referral/consult to Gastroenterology  -     Ambulatory referral/consult to Endo Procedure   -     sodium,potassium,mag sulfates (SUPREP BOWEL PREP KIT) 17.5-3.13-1.6 gram SolR; Use as directed  Dispense: 354 mL; Refill: 0    Menorrhagia with irregular cycle    Other orders  -     omeprazole (PRILOSEC) 20 MG capsule; Take 1 capsule (20 mg total) by mouth once daily.  Dispense: 90 capsule; Refill: 3    Follow up to be determined after results/ procedure(s).    Thank you so much for allowing me to participate in the care of DEBORAH Landry

## 2024-06-07 ENCOUNTER — HOSPITAL ENCOUNTER (OUTPATIENT)
Dept: PREADMISSION TESTING | Facility: HOSPITAL | Age: 39
Discharge: HOME | End: 2024-06-07
Attending: INTERNAL MEDICINE
Payer: COMMERCIAL

## 2024-06-07 ENCOUNTER — TELEPHONE (OUTPATIENT)
Dept: HEMATOLOGY/ONCOLOGY | Facility: CLINIC | Age: 39
End: 2024-06-07
Payer: COMMERCIAL

## 2024-06-07 DIAGNOSIS — D50.0 IRON DEFICIENCY ANEMIA DUE TO CHRONIC BLOOD LOSS: Primary | ICD-10-CM

## 2024-06-07 DIAGNOSIS — K21.9 GASTROESOPHAGEAL REFLUX DISEASE, UNSPECIFIED WHETHER ESOPHAGITIS PRESENT: ICD-10-CM

## 2024-06-07 NOTE — TELEPHONE ENCOUNTER
----- Message from Nanda Leblanc RN sent at 6/4/2024 10:49 AM CDT -----  Auth has been obtained for Injectafer x2. Patient is ready to schedule. Thank you

## 2024-06-10 ENCOUNTER — PATIENT MESSAGE (OUTPATIENT)
Dept: HEMATOLOGY/ONCOLOGY | Facility: CLINIC | Age: 39
End: 2024-06-10
Payer: COMMERCIAL

## 2024-06-14 ENCOUNTER — INFUSION (OUTPATIENT)
Dept: INFUSION THERAPY | Facility: HOSPITAL | Age: 39
End: 2024-06-14
Attending: INTERNAL MEDICINE
Payer: COMMERCIAL

## 2024-06-14 VITALS
DIASTOLIC BLOOD PRESSURE: 86 MMHG | HEART RATE: 60 BPM | SYSTOLIC BLOOD PRESSURE: 136 MMHG | OXYGEN SATURATION: 99 % | RESPIRATION RATE: 16 BRPM | TEMPERATURE: 98 F

## 2024-06-14 DIAGNOSIS — D50.0 IRON DEFICIENCY ANEMIA DUE TO CHRONIC BLOOD LOSS: Primary | ICD-10-CM

## 2024-06-14 PROCEDURE — 63600175 PHARM REV CODE 636 W HCPCS: Performed by: INTERNAL MEDICINE

## 2024-06-14 PROCEDURE — 25000003 PHARM REV CODE 250: Performed by: INTERNAL MEDICINE

## 2024-06-14 PROCEDURE — 96375 TX/PRO/DX INJ NEW DRUG ADDON: CPT

## 2024-06-14 PROCEDURE — 96365 THER/PROPH/DIAG IV INF INIT: CPT

## 2024-06-14 RX ORDER — SODIUM CHLORIDE 9 MG/ML
INJECTION, SOLUTION INTRAVENOUS CONTINUOUS
OUTPATIENT
Start: 2024-06-21

## 2024-06-14 RX ORDER — SODIUM CHLORIDE 9 MG/ML
INJECTION, SOLUTION INTRAVENOUS CONTINUOUS
Status: DISCONTINUED | OUTPATIENT
Start: 2024-06-14 | End: 2024-06-14 | Stop reason: HOSPADM

## 2024-06-14 RX ORDER — SODIUM CHLORIDE 0.9 % (FLUSH) 0.9 %
10 SYRINGE (ML) INJECTION
OUTPATIENT
Start: 2024-06-21

## 2024-06-14 RX ORDER — EPINEPHRINE 0.3 MG/.3ML
0.3 INJECTION SUBCUTANEOUS ONCE AS NEEDED
OUTPATIENT
Start: 2024-06-21

## 2024-06-14 RX ORDER — HEPARIN 100 UNIT/ML
5 SYRINGE INTRAVENOUS
OUTPATIENT
Start: 2024-06-21

## 2024-06-14 RX ORDER — SODIUM CHLORIDE 0.9 % (FLUSH) 0.9 %
10 SYRINGE (ML) INJECTION
Status: DISCONTINUED | OUTPATIENT
Start: 2024-06-14 | End: 2024-06-14 | Stop reason: HOSPADM

## 2024-06-14 RX ORDER — DIPHENHYDRAMINE HYDROCHLORIDE 50 MG/ML
50 INJECTION INTRAMUSCULAR; INTRAVENOUS ONCE AS NEEDED
OUTPATIENT
Start: 2024-06-21

## 2024-06-14 RX ORDER — HEPARIN 100 UNIT/ML
5 SYRINGE INTRAVENOUS
Status: DISCONTINUED | OUTPATIENT
Start: 2024-06-14 | End: 2024-06-14 | Stop reason: HOSPADM

## 2024-06-14 RX ORDER — DIPHENHYDRAMINE HYDROCHLORIDE 50 MG/ML
50 INJECTION INTRAMUSCULAR; INTRAVENOUS ONCE AS NEEDED
Status: DISCONTINUED | OUTPATIENT
Start: 2024-06-14 | End: 2024-06-14 | Stop reason: HOSPADM

## 2024-06-14 RX ADMIN — HYDROCORTISONE SODIUM SUCCINATE 100 MG: 100 INJECTION, POWDER, FOR SOLUTION INTRAMUSCULAR; INTRAVENOUS at 08:06

## 2024-06-14 RX ADMIN — FERRIC CARBOXYMALTOSE INJECTION 750 MG: 50 INJECTION, SOLUTION INTRAVENOUS at 08:06

## 2024-06-14 RX ADMIN — SODIUM CHLORIDE: 9 INJECTION, SOLUTION INTRAVENOUS at 08:06

## 2024-06-14 NOTE — NURSING
0817 pt c/o nausea, SOB, coughing. Injectafer stopped, NS running. Steroid released and given Dr. Khoobehi notifed.   0833 symptoms resolved. Dr. Khoobehi notifed and would like to resume injectafer over 30 mins and see if pt will tolerate. Injectafer restarted to infusion over 30 mins.  0850 pt tolerating injectafer infusion at this time.   0910 Injectafer infusion complete and pt tolerated well.

## 2024-06-14 NOTE — PLAN OF CARE
Pt had mild reaction to iron infusion. Pt informed next infusion she will be premedicated w/ steroid and iron will run over 30 mins.   Problem: Adult Inpatient Plan of Care  Goal: Plan of Care Review  Outcome: Progressing  Flowsheets (Taken 6/14/2024 0947)  Plan of Care Reviewed With: patient  Goal: Patient-Specific Goal (Individualized)  Outcome: Progressing  Flowsheets (Taken 6/14/2024 0947)  Individualized Care Needs: feet up, blanket offered  Anxieties, Fears or Concerns: 1st iron infusion pt nervous  Goal: Optimal Comfort and Wellbeing  Outcome: Progressing  Intervention: Monitor Pain and Promote Comfort  Flowsheets (Taken 6/14/2024 0947)  Pain Management Interventions:   quiet environment facilitated   pillow support provided  Intervention: Provide Person-Centered Care  Flowsheets (Taken 6/14/2024 0947)  Trust Relationship/Rapport:   care explained   choices provided   emotional support provided   empathic listening provided   questions answered   questions encouraged   reassurance provided   thoughts/feelings acknowledged

## 2024-06-21 ENCOUNTER — TELEPHONE (OUTPATIENT)
Dept: HEMATOLOGY/ONCOLOGY | Facility: CLINIC | Age: 39
End: 2024-06-21
Payer: COMMERCIAL

## 2024-06-21 ENCOUNTER — INFUSION (OUTPATIENT)
Dept: INFUSION THERAPY | Facility: HOSPITAL | Age: 39
End: 2024-06-21
Attending: INTERNAL MEDICINE
Payer: COMMERCIAL

## 2024-06-21 VITALS
OXYGEN SATURATION: 100 % | SYSTOLIC BLOOD PRESSURE: 139 MMHG | TEMPERATURE: 98 F | DIASTOLIC BLOOD PRESSURE: 90 MMHG | HEART RATE: 67 BPM | RESPIRATION RATE: 16 BRPM

## 2024-06-21 DIAGNOSIS — D50.0 IRON DEFICIENCY ANEMIA DUE TO CHRONIC BLOOD LOSS: Primary | ICD-10-CM

## 2024-06-21 PROCEDURE — 96375 TX/PRO/DX INJ NEW DRUG ADDON: CPT

## 2024-06-21 PROCEDURE — 25000003 PHARM REV CODE 250: Performed by: INTERNAL MEDICINE

## 2024-06-21 PROCEDURE — 96365 THER/PROPH/DIAG IV INF INIT: CPT

## 2024-06-21 PROCEDURE — 63600175 PHARM REV CODE 636 W HCPCS: Mod: JZ,JG | Performed by: INTERNAL MEDICINE

## 2024-06-21 RX ORDER — SODIUM CHLORIDE 0.9 % (FLUSH) 0.9 %
10 SYRINGE (ML) INJECTION
Status: CANCELLED | OUTPATIENT
Start: 2024-06-21

## 2024-06-21 RX ORDER — HEPARIN 100 UNIT/ML
5 SYRINGE INTRAVENOUS
Status: CANCELLED | OUTPATIENT
Start: 2024-06-21

## 2024-06-21 RX ORDER — DIPHENHYDRAMINE HYDROCHLORIDE 50 MG/ML
50 INJECTION INTRAMUSCULAR; INTRAVENOUS ONCE AS NEEDED
Status: DISCONTINUED | OUTPATIENT
Start: 2024-06-21 | End: 2024-06-21 | Stop reason: HOSPADM

## 2024-06-21 RX ORDER — SODIUM CHLORIDE 9 MG/ML
INJECTION, SOLUTION INTRAVENOUS CONTINUOUS
Status: CANCELLED | OUTPATIENT
Start: 2024-06-21

## 2024-06-21 RX ORDER — EPINEPHRINE 0.3 MG/.3ML
0.3 INJECTION SUBCUTANEOUS ONCE AS NEEDED
OUTPATIENT
Start: 2024-06-21

## 2024-06-21 RX ORDER — SODIUM CHLORIDE 9 MG/ML
INJECTION, SOLUTION INTRAVENOUS
Status: DISCONTINUED | OUTPATIENT
Start: 2024-06-21 | End: 2024-06-21 | Stop reason: HOSPADM

## 2024-06-21 RX ORDER — DIPHENHYDRAMINE HYDROCHLORIDE 50 MG/ML
50 INJECTION INTRAMUSCULAR; INTRAVENOUS ONCE AS NEEDED
OUTPATIENT
Start: 2024-06-21

## 2024-06-21 RX ORDER — HEPARIN 100 UNIT/ML
5 SYRINGE INTRAVENOUS
Status: DISCONTINUED | OUTPATIENT
Start: 2024-06-21 | End: 2024-06-21 | Stop reason: HOSPADM

## 2024-06-21 RX ORDER — SODIUM CHLORIDE 0.9 % (FLUSH) 0.9 %
10 SYRINGE (ML) INJECTION
Status: DISCONTINUED | OUTPATIENT
Start: 2024-06-21 | End: 2024-06-21 | Stop reason: HOSPADM

## 2024-06-21 RX ADMIN — SODIUM CHLORIDE: 9 INJECTION, SOLUTION INTRAVENOUS at 03:06

## 2024-06-21 RX ADMIN — FERRIC CARBOXYMALTOSE INJECTION 750 MG: 50 INJECTION, SOLUTION INTRAVENOUS at 03:06

## 2024-06-21 RX ADMIN — HYDROCORTISONE SODIUM SUCCINATE 100 MG: 100 INJECTION, POWDER, FOR SOLUTION INTRAMUSCULAR; INTRAVENOUS at 03:06

## 2024-06-21 NOTE — TELEPHONE ENCOUNTER
Please disregard per call center. See previous message     ----- Message from Anat Mercer MA sent at 6/21/2024  4:00 PM CDT -----  Please r/s appts to after 7/16 per pt..[phone]

## 2024-07-01 ENCOUNTER — PATIENT MESSAGE (OUTPATIENT)
Dept: GASTROENTEROLOGY | Facility: CLINIC | Age: 39
End: 2024-07-01
Payer: COMMERCIAL

## 2024-07-02 ENCOUNTER — TELEPHONE (OUTPATIENT)
Dept: PREADMISSION TESTING | Facility: HOSPITAL | Age: 39
End: 2024-07-02
Payer: COMMERCIAL

## 2024-07-02 ENCOUNTER — PATIENT MESSAGE (OUTPATIENT)
Dept: HEMATOLOGY/ONCOLOGY | Facility: CLINIC | Age: 39
End: 2024-07-02
Payer: COMMERCIAL

## 2024-07-02 DIAGNOSIS — Z01.818 PRE-OP TESTING: Primary | ICD-10-CM

## 2024-07-03 ENCOUNTER — LAB VISIT (OUTPATIENT)
Dept: LAB | Facility: HOSPITAL | Age: 39
End: 2024-07-03
Attending: INTERNAL MEDICINE
Payer: COMMERCIAL

## 2024-07-03 DIAGNOSIS — Z01.818 PRE-OP TESTING: ICD-10-CM

## 2024-07-03 PROCEDURE — 85025 COMPLETE CBC W/AUTO DIFF WBC: CPT | Performed by: NURSE ANESTHETIST, CERTIFIED REGISTERED

## 2024-07-03 PROCEDURE — 36415 COLL VENOUS BLD VENIPUNCTURE: CPT | Mod: PN | Performed by: NURSE ANESTHETIST, CERTIFIED REGISTERED

## 2024-07-04 LAB
ANISOCYTOSIS BLD QL SMEAR: SLIGHT
BASOPHILS # BLD AUTO: 0 K/UL (ref 0–0.2)
BASOPHILS NFR BLD: 0 % (ref 0–1.9)
BURR CELLS BLD QL SMEAR: ABNORMAL
DACRYOCYTES BLD QL SMEAR: ABNORMAL
DIFFERENTIAL METHOD BLD: ABNORMAL
DOHLE BOD BLD QL SMEAR: PRESENT
EOSINOPHIL # BLD AUTO: 0 K/UL (ref 0–0.5)
EOSINOPHIL NFR BLD: 0.5 % (ref 0–8)
ERYTHROCYTE [DISTWIDTH] IN BLOOD BY AUTOMATED COUNT: ABNORMAL % (ref 11.5–14.5)
HCT VFR BLD AUTO: 36.5 % (ref 37–48.5)
HGB BLD-MCNC: 11.7 G/DL (ref 12–16)
HYPOCHROMIA BLD QL SMEAR: ABNORMAL
IMM GRANULOCYTES # BLD AUTO: 0.01 K/UL (ref 0–0.04)
IMM GRANULOCYTES NFR BLD AUTO: 0.3 % (ref 0–0.5)
LYMPHOCYTES # BLD AUTO: 1.9 K/UL (ref 1–4.8)
LYMPHOCYTES NFR BLD: 49.5 % (ref 18–48)
MCH RBC QN AUTO: 26.9 PG (ref 27–31)
MCHC RBC AUTO-ENTMCNC: 32.1 G/DL (ref 32–36)
MCV RBC AUTO: 84 FL (ref 82–98)
MONOCYTES # BLD AUTO: 0.2 K/UL (ref 0.3–1)
MONOCYTES NFR BLD: 6.2 % (ref 4–15)
NEUTROPHILS # BLD AUTO: 1.7 K/UL (ref 1.8–7.7)
NEUTROPHILS NFR BLD: 43.5 % (ref 38–73)
NRBC BLD-RTO: 0 /100 WBC
OVALOCYTES BLD QL SMEAR: ABNORMAL
PLATELET # BLD AUTO: 257 K/UL (ref 150–450)
PLATELET BLD QL SMEAR: ABNORMAL
PMV BLD AUTO: 11.5 FL (ref 9.2–12.9)
POIKILOCYTOSIS BLD QL SMEAR: SLIGHT
POLYCHROMASIA BLD QL SMEAR: ABNORMAL
RBC # BLD AUTO: 4.35 M/UL (ref 4–5.4)
SCHISTOCYTES BLD QL SMEAR: ABNORMAL
SCHISTOCYTES BLD QL SMEAR: PRESENT
SPHEROCYTES BLD QL SMEAR: ABNORMAL
WBC # BLD AUTO: 3.88 K/UL (ref 3.9–12.7)

## 2024-07-10 ENCOUNTER — HOSPITAL ENCOUNTER (OUTPATIENT)
Facility: HOSPITAL | Age: 39
Discharge: HOME | End: 2024-07-10
Attending: INTERNAL MEDICINE | Admitting: INTERNAL MEDICINE
Payer: COMMERCIAL

## 2024-07-10 ENCOUNTER — ANESTHESIA (OUTPATIENT)
Dept: ENDOSCOPY | Facility: HOSPITAL | Age: 39
End: 2024-07-10
Payer: COMMERCIAL

## 2024-07-10 ENCOUNTER — ANESTHESIA EVENT (OUTPATIENT)
Dept: ENDOSCOPY | Facility: HOSPITAL | Age: 39
End: 2024-07-10
Payer: COMMERCIAL

## 2024-07-10 DIAGNOSIS — D50.0 IRON DEFICIENCY ANEMIA DUE TO CHRONIC BLOOD LOSS: Primary | ICD-10-CM

## 2024-07-10 PROCEDURE — 63600175 PHARM REV CODE 636 W HCPCS: Performed by: NURSE ANESTHETIST, CERTIFIED REGISTERED

## 2024-07-10 PROCEDURE — 27201012 HC FORCEPS, HOT/COLD, DISP: Performed by: INTERNAL MEDICINE

## 2024-07-10 PROCEDURE — 43239 EGD BIOPSY SINGLE/MULTIPLE: CPT | Mod: 51,,, | Performed by: INTERNAL MEDICINE

## 2024-07-10 PROCEDURE — 25000003 PHARM REV CODE 250: Performed by: NURSE ANESTHETIST, CERTIFIED REGISTERED

## 2024-07-10 PROCEDURE — 88305 TISSUE EXAM BY PATHOLOGIST: CPT | Mod: 26,,, | Performed by: PATHOLOGY

## 2024-07-10 PROCEDURE — 37000009 HC ANESTHESIA EA ADD 15 MINS: Performed by: INTERNAL MEDICINE

## 2024-07-10 PROCEDURE — 43239 EGD BIOPSY SINGLE/MULTIPLE: CPT | Performed by: INTERNAL MEDICINE

## 2024-07-10 PROCEDURE — 45378 DIAGNOSTIC COLONOSCOPY: CPT | Performed by: INTERNAL MEDICINE

## 2024-07-10 PROCEDURE — 37000008 HC ANESTHESIA 1ST 15 MINUTES: Performed by: INTERNAL MEDICINE

## 2024-07-10 PROCEDURE — 45378 DIAGNOSTIC COLONOSCOPY: CPT | Mod: ,,, | Performed by: INTERNAL MEDICINE

## 2024-07-10 PROCEDURE — 88305 TISSUE EXAM BY PATHOLOGIST: CPT | Performed by: PATHOLOGY

## 2024-07-10 PROCEDURE — 25000003 PHARM REV CODE 250: Performed by: INTERNAL MEDICINE

## 2024-07-10 RX ORDER — DEXTROMETHORPHAN/PSEUDOEPHED 2.5-7.5/.8
DROPS ORAL
Status: COMPLETED | OUTPATIENT
Start: 2024-07-10 | End: 2024-07-10

## 2024-07-10 RX ORDER — PROPOFOL 10 MG/ML
VIAL (ML) INTRAVENOUS
Status: DISCONTINUED | OUTPATIENT
Start: 2024-07-10 | End: 2024-07-10

## 2024-07-10 RX ORDER — LIDOCAINE HYDROCHLORIDE 10 MG/ML
INJECTION, SOLUTION EPIDURAL; INFILTRATION; INTRACAUDAL; PERINEURAL
Status: DISCONTINUED | OUTPATIENT
Start: 2024-07-10 | End: 2024-07-10

## 2024-07-10 RX ADMIN — PROPOFOL 100 MG: 10 INJECTION, EMULSION INTRAVENOUS at 09:07

## 2024-07-10 RX ADMIN — PROPOFOL 50 MG: 10 INJECTION, EMULSION INTRAVENOUS at 09:07

## 2024-07-10 RX ADMIN — LIDOCAINE HYDROCHLORIDE 50 MG: 10 SOLUTION INTRAVENOUS at 09:07

## 2024-07-10 RX ADMIN — SODIUM CHLORIDE, SODIUM LACTATE, POTASSIUM CHLORIDE, AND CALCIUM CHLORIDE: .6; .31; .03; .02 INJECTION, SOLUTION INTRAVENOUS at 09:07

## 2024-07-10 NOTE — TRANSFER OF CARE
"Anesthesia Transfer of Care Note    Patient: Brian Moss    Procedure(s) Performed: Procedure(s) (LRB):  EGD (ESOPHAGOGASTRODUODENOSCOPY) (N/A)  COLONOSCOPY (N/A)    Patient location: GI    Anesthesia Type: MAC    Transport from OR: Transported from OR on room air with adequate spontaneous ventilation    Post pain: adequate analgesia    Post assessment: no apparent anesthetic complications    Post vital signs: stable    Level of consciousness: sedated    Nausea/Vomiting: no nausea/vomiting    Complications: none    Transfer of care protocol was followed      Last vitals: Visit Vitals  BP (!) 155/89 (BP Location: Left arm, Patient Position: Lying)   Pulse 68   Temp 36.8 °C (98.2 °F) (Temporal)   Resp 18   Ht 5' 11" (1.803 m)   Wt 120.2 kg (265 lb)   SpO2 100%   Breastfeeding No   BMI 36.96 kg/m²     "

## 2024-07-10 NOTE — [employer]OVATION PATIENT INSTRUCTIONS
Discharge Summary/Instructions after an Endoscopic Procedure  Patient Name: Brian Moss  Patient MRN: [id number]  Patient YOB: 1985  Wednesday, July 10, 2024 Yajaira Moore MD  Dear patient,  As a result of recent federal legislation (The Federal Cures Act), you may   receive lab or pathology results from your procedure in your [facility]   account before your physician is able to contact you. Your physician or   their representative will relay the results to you with their   recommendations at their soonest availability.  Thank you,  RESTRICTIONS:  During your procedure today, you received medications for sedation.  These   medications may affect your judgment, balance and coordination.  Therefore,   for 24 hours, you have the following restrictions:   - DO NOT drive a car, operate machinery, make legal/financial decisions,   sign important papers or drink alcohol.    ACTIVITY:  Today: no heavy lifting, straining or running due to procedural   sedation/anesthesia.  The following day: return to full activity including work.  DIET:  Eat and drink normally unless instructed otherwise.     TREATMENT FOR COMMON SIDE EFFECTS:  - Mild abdominal pain, nausea, belching, bloating or excessive gas:  rest,   eat lightly and use a heating pad.  - Sore Throat: treat with throat lozenges and/or gargle with warm salt   water.  - Because air was used during the procedure, expelling large amounts of air   from your rectum or belching is normal.  - If a bowel prep was taken, you may not have a bowel movement for 1-3 days.    This is normal.  SYMPTOMS TO WATCH FOR AND REPORT TO YOUR PHYSICIAN:  1. Abdominal pain or bloating, other than gas cramps.  2. Chest pain.  3. Back pain.  4. Signs of infection such as: chills or fever occurring within 24 hours   after the procedure.  5. Rectal bleeding, which would show as bright red, maroon, or black stools.   (A tablespoon of blood from the rectum is not serious, especially  if   hemorrhoids are present.)  6. Vomiting.  7. Weakness or dizziness.  GO DIRECTLY TO THE NEAREST EMERGENCY ROOM IF YOU HAVE ANY OF THE FOLLOWING:      Difficulty breathing              Chills and/or fever over 101 F   Persistent vomiting and/or vomiting blood   Severe abdominal pain   Severe chest pain   Black, tarry stools   Bleeding- more than one tablespoon   Any other symptom or condition that you feel may need urgent attention  Your doctor recommends these additional instructions:  If any biopsies were taken, your doctors clinic will contact you in 1 to 2   weeks with any results.  - Discharge patient to home (via wheelchair).   - Resume previous diet.   - Continue present medications.   - Repeat colonoscopy at age 45 for screening purposes.   - Patient has a contact number available for emergencies.  The signs and   symptoms of potential delayed complications were discussed with the   patient.  Return to normal activities tomorrow.  Written discharge   instructions were provided to the patient.  For questions, problems or results please call your physician Yajaira Moore MD at Work:  [phone]  If you have any questions about the above instructions, call the GI   department at [phone] or call the endoscopy unit at [phone]   from 7am until 3 pm.  [facility] [place], EMERGENCY ROOM PHONE NUMBER:   [phone]  IF A COMPLICATION OR EMERGENCY SITUATION ARISES AND YOU ARE UNABLE TO REACH   YOUR PHYSICIAN - GO DIRECTLY TO THE EMERGENCY ROOM.  I have read or have had read to me these discharge instructions for my   procedure and have received a written copy.  I understand these   instructions and will follow-up with my physician if I have any questions.     __________________________________       _____________________________________  Nurse Signature                                          Patient/Designated   Responsible Party Signature  MD Yajaira De La Rosa  JOCELIN Moore MD  7/10/2024 9:54:18 AM  [employer]

## 2024-07-10 NOTE — ANESTHESIA POSTPROCEDURE EVALUATION
Anesthesia Post Evaluation    Patient: Brian Moss    Procedure(s) Performed: Procedure(s) (LRB):  EGD (ESOPHAGOGASTRODUODENOSCOPY) (N/A)  COLONOSCOPY (N/A)    Final Anesthesia Type: MAC      Patient location during evaluation: GI PACU  Patient participation: Yes- Able to Participate  Level of consciousness: awake and alert  Post-procedure vital signs: reviewed and stable  Pain management: adequate  Airway patency: patent    PONV status at discharge: No PONV  Anesthetic complications: no      Cardiovascular status: blood pressure returned to baseline  Respiratory status: unassisted and spontaneous ventilation  Hydration status: euvolemic  Follow-up not needed.              Vitals Value Taken Time   /78 07/10/24 1004   Temp 36.5 °C (97.7 °F) 07/10/24 1004   Pulse 68 07/10/24 1004   Resp 17 07/10/24 1004   SpO2 100 % 07/10/24 1004         Event Time   Out of Recovery 10:11:50         Pain/Benjamin Score: Benjamin Score: 5 (7/10/2024  9:54 AM)

## 2024-07-10 NOTE — PLAN OF CARE
Dr. Moore  at bedside discussing findings. VSS. Patient alert. No N/V. No bleeding, no pain. Patient released from unit.

## 2024-07-10 NOTE — DISCHARGE SUMMARY
[facility] - Endoscopy (Hospital)  Discharge Note  Short Stay    Procedure(s) (LRB):  EGD (ESOPHAGOGASTRODUODENOSCOPY) (N/A)  COLONOSCOPY (N/A)      OUTCOME: Patient tolerated treatment/procedure well without complication and is now ready for discharge.    DISPOSITION: Home or Self Care    FINAL DIAGNOSIS:  Iron deficiency anemia due to chronic blood loss    FOLLOWUP: With primary care provider    DISCHARGE INSTRUCTIONS:  No discharge procedures on file.

## 2024-07-10 NOTE — ANESTHESIA PREPROCEDURE EVALUATION
07/10/2024  Brian Moss is a 39 y.o., female.      Pre-op Assessment    I have reviewed the Patient Summary Reports.    I have reviewed the NPO Status.   I have reviewed the Medications.     Review of Systems  Anesthesia Hx:  No problems with previous Anesthesia                Cardiovascular:     Hypertension, well controlled              ECG has been reviewed.                          Hepatic/GI:  Bowel Prep.                Endocrine:        Obesity / BMI > 30      Physical Exam  General: Well nourished    Airway:  Mallampati: II   Mouth Opening: Normal  TM Distance: Normal  Tongue: Normal  Neck ROM: Normal ROM    Dental:  Intact    Chest/Lungs:  Normal Respiratory Rate    Heart:  Rate: Normal  Rhythm: Regular Rhythm    Anesthesia Plan  Type of Anesthesia, risks & benefits discussed:    Anesthesia Type: MAC  Intra-op Monitoring Plan: Standard ASA Monitors  Post Op Pain Control Plan: multimodal analgesia  Induction:  IV  Informed Consent: Informed consent signed with the Patient and all parties understand the risks and agree with anesthesia plan.  All questions answered.   ASA Score: 2  Day of Surgery Review of History & Physical: H&P Update referred to the surgeon/provider.    Ready For Surgery From Anesthesia Perspective.   .

## 2024-07-10 NOTE — H&P
Short Stay Endoscopy History and Physical    PCP - Pete Wallace MD    Procedure - EGD and colonoscopy  ASA - per anesthesia  Mallampati - per anesthesia  History of Anesthesia problems - no  Family history Anesthesia problems -  no     HPI:  This is a 39 y.o. female here for evaluation of :   Active Hospital Problems    Diagnosis  POA    *Iron deficiency anemia due to chronic blood loss [D50.0]  Yes      Resolved Hospital Problems   No resolved problems to display.         Health Maintenance         Date Due Completion Date    TETANUS VACCINE 2012    Cervical Cancer Screening 2019 (Done)    Override on 2016: Done (via wom[facility])    COVID-19 Vaccine (3 - 2023-24 season) 2023    Influenza Vaccine (1) 2024    Hemoglobin A1c (Diabetic Prevention Screening) 2027              ROS:  CONSTITUTIONAL: Denies weight change,  fatigue, fevers, chills, night sweats.  CARDIOVASCULAR: Denies chest pain, shortness of breath, orthopnea and edema.  RESPIRATORY: Denies cough, hemoptysis, dyspnea, and wheezing.  GI: See HPI.    Medical History:   Past Medical History:   Diagnosis Date    Angioedema     H. pylori infection     Hypertension     diet controlled       Surgical History:   Past Surgical History:   Procedure Laterality Date    BILATERAL TUBAL LIGATION       SECTION      x 2 12, 14    DILATION AND CURETTAGE OF UTERUS      KNEE ARTHROSCOPY Left 2016       Family History:   Family History   Problem Relation Name Age of Onset    Diabetes Mother      Hypertension Mother      Diabetes Father      Diabetes Brother      Hypertension Brother         Social History:   Social History     Tobacco Use    Smoking status: Never    Smokeless tobacco: Never   Substance Use Topics    Alcohol use: No    Drug use: No       Allergies:   Review of patient's allergies indicates:  No Known Allergies    Medications:   No current  facility-administered medications on file prior to encounter.     Current Outpatient Medications on File Prior to Encounter   Medication Sig Dispense Refill    amLODIPine (NORVASC) 5 MG tablet Take 1 tablet (5 mg total) by mouth once daily. 30 tablet 11    ferrous sulfate (FEOSOL) 325 mg (65 mg iron) Tab tablet Take 1 tablet (325 mg total) by mouth 2 (two) times daily. 60 tablet 3    omeprazole (PRILOSEC) 20 MG capsule Take 1 capsule (20 mg total) by mouth once daily. 90 capsule 3    sodium,potassium,mag sulfates (SUPREP BOWEL PREP KIT) 17.5-3.13-1.6 gram SolR Use as directed 354 mL 0       Physical Exam:  Vital Signs: There were no vitals filed for this visit.  General Appearance: Well appearing in no acute distress  ENT: OP clear  Chest: CTA B  CV: RRR, no m/r/g  Abd: s/nt/nd/nabs  Ext: no edema    Labs:Reviewed    IMP:  Active Hospital Problems    Diagnosis  POA    *Iron deficiency anemia due to chronic blood loss [D50.0]  Yes      Resolved Hospital Problems   No resolved problems to display.         Plan:   I have explained the risks and benefits of upper endoscopy and colonoscopy to the patient including but not limited to bleeding, perforation, infection, and death. The patient wishes to proceed.

## 2024-07-10 NOTE — PROVATION PATIENT INSTRUCTIONS
Discharge Summary/Instructions after an Endoscopic Procedure  Patient Name: Brian Moss  Patient MRN: [id number]  Patient YOB: 1985  Wednesday, July 10, 2024 Yajaira Moore MD  Dear patient,  As a result of recent federal legislation (The Federal Cures Act), you may   receive lab or pathology results from your procedure in your [facility]   account before your physician is able to contact you. Your physician or   their representative will relay the results to you with their   recommendations at their soonest availability.  Thank you,  RESTRICTIONS:  During your procedure today, you received medications for sedation.  These   medications may affect your judgment, balance and coordination.  Therefore,   for 24 hours, you have the following restrictions:   - DO NOT drive a car, operate machinery, make legal/financial decisions,   sign important papers or drink alcohol.    ACTIVITY:  Today: no heavy lifting, straining or running due to procedural   sedation/anesthesia.  The following day: return to full activity including work.  DIET:  Eat and drink normally unless instructed otherwise.     TREATMENT FOR COMMON SIDE EFFECTS:  - Mild abdominal pain, nausea, belching, bloating or excessive gas:  rest,   eat lightly and use a heating pad.  - Sore Throat: treat with throat lozenges and/or gargle with warm salt   water.  - Because air was used during the procedure, expelling large amounts of air   from your rectum or belching is normal.  - If a bowel prep was taken, you may not have a bowel movement for 1-3 days.    This is normal.  SYMPTOMS TO WATCH FOR AND REPORT TO YOUR PHYSICIAN:  1. Abdominal pain or bloating, other than gas cramps.  2. Chest pain.  3. Back pain.  4. Signs of infection such as: chills or fever occurring within 24 hours   after the procedure.  5. Rectal bleeding, which would show as bright red, maroon, or black stools.   (A tablespoon of blood from the rectum is not serious, especially  if   hemorrhoids are present.)  6. Vomiting.  7. Weakness or dizziness.  GO DIRECTLY TO THE NEAREST EMERGENCY ROOM IF YOU HAVE ANY OF THE FOLLOWING:      Difficulty breathing              Chills and/or fever over 101 F   Persistent vomiting and/or vomiting blood   Severe abdominal pain   Severe chest pain   Black, tarry stools   Bleeding- more than one tablespoon   Any other symptom or condition that you feel may need urgent attention  Your doctor recommends these additional instructions:  If any biopsies were taken, your doctors clinic will contact you in 1 to 2   weeks with any results.  - Discharge patient to home (via wheelchair).   - Resume previous diet.   - Continue present medications.   - Await pathology results.   - Patient has a contact number available for emergencies.  The signs and   symptoms of potential delayed complications were discussed with the   patient.  Return to normal activities tomorrow.  Written discharge   instructions were provided to the patient.  For questions, problems or results please call your physician Yajaira Moore MD at Work:  [phone]  If you have any questions about the above instructions, call the GI   department at [phone] or call the endoscopy unit at [phone]   from 7am until 3 pm.  [facility], EMERGENCY ROOM PHONE NUMBER:   [phone]  IF A COMPLICATION OR EMERGENCY SITUATION ARISES AND YOU ARE UNABLE TO REACH   YOUR PHYSICIAN - GO DIRECTLY TO THE EMERGENCY ROOM.  I have read or have had read to me these discharge instructions for my   procedure and have received a written copy.  I understand these   instructions and will follow-up with my physician if I have any questions.     __________________________________       _____________________________________  Nurse Signature                                          Patient/Designated   Responsible Party Signature  MD Yajaira De La Rosa MD  7/10/2024  9:56:23 AM  [employer]

## 2024-07-11 VITALS
RESPIRATION RATE: 17 BRPM | HEIGHT: 71 IN | DIASTOLIC BLOOD PRESSURE: 78 MMHG | WEIGHT: 265 LBS | BODY MASS INDEX: 37.1 KG/M2 | TEMPERATURE: 98 F | SYSTOLIC BLOOD PRESSURE: 132 MMHG | OXYGEN SATURATION: 100 % | HEART RATE: 68 BPM

## 2024-07-11 LAB
FINAL PATHOLOGIC DIAGNOSIS: NORMAL
GROSS: NORMAL
Lab: NORMAL

## 2024-10-24 ENCOUNTER — PATIENT MESSAGE (OUTPATIENT)
Dept: RESEARCH | Facility: HOSPITAL | Age: 39
End: 2024-10-24
Payer: COMMERCIAL

## 2025-05-08 ENCOUNTER — PATIENT MESSAGE (OUTPATIENT)
Dept: RESEARCH | Facility: HOSPITAL | Age: 40
End: 2025-05-08
Payer: COMMERCIAL

## 2025-07-01 ENCOUNTER — LAB VISIT (OUTPATIENT)
Dept: LAB | Facility: HOSPITAL | Age: 40
End: 2025-07-01
Attending: FAMILY MEDICINE
Payer: COMMERCIAL

## 2025-07-01 ENCOUNTER — OFFICE VISIT (OUTPATIENT)
Dept: INTERNAL MEDICINE | Facility: CLINIC | Age: 40
End: 2025-07-01
Payer: COMMERCIAL

## 2025-07-01 VITALS
TEMPERATURE: 98 F | HEART RATE: 95 BPM | SYSTOLIC BLOOD PRESSURE: 120 MMHG | HEIGHT: 71 IN | WEIGHT: 282.63 LBS | BODY MASS INDEX: 39.57 KG/M2 | DIASTOLIC BLOOD PRESSURE: 80 MMHG | OXYGEN SATURATION: 98 %

## 2025-07-01 DIAGNOSIS — I10 PRIMARY HYPERTENSION: ICD-10-CM

## 2025-07-01 DIAGNOSIS — K21.9 GASTROESOPHAGEAL REFLUX DISEASE WITHOUT ESOPHAGITIS: ICD-10-CM

## 2025-07-01 DIAGNOSIS — N92.4 EXCESSIVE BLEEDING IN PREMENOPAUSAL PERIOD: ICD-10-CM

## 2025-07-01 DIAGNOSIS — E66.01 MORBID OBESITY: ICD-10-CM

## 2025-07-01 DIAGNOSIS — D50.0 IRON DEFICIENCY ANEMIA DUE TO CHRONIC BLOOD LOSS: ICD-10-CM

## 2025-07-01 DIAGNOSIS — R06.83 SNORING: ICD-10-CM

## 2025-07-01 DIAGNOSIS — Z00.00 ROUTINE HEALTH MAINTENANCE: Primary | ICD-10-CM

## 2025-07-01 DIAGNOSIS — R53.83 FATIGUE, UNSPECIFIED TYPE: ICD-10-CM

## 2025-07-01 LAB
ABSOLUTE EOSINOPHIL (OHS): 0.04 K/UL
ABSOLUTE MONOCYTE (OHS): 0.28 K/UL (ref 0.3–1)
ABSOLUTE NEUTROPHIL COUNT (OHS): 1.9 K/UL (ref 1.8–7.7)
ALBUMIN SERPL BCP-MCNC: 3.9 G/DL (ref 3.5–5.2)
ALP SERPL-CCNC: 81 UNIT/L (ref 40–150)
ALT SERPL W/O P-5'-P-CCNC: 21 UNIT/L (ref 10–44)
ANION GAP (OHS): 10 MMOL/L (ref 8–16)
AST SERPL-CCNC: 23 UNIT/L (ref 11–45)
BASOPHILS # BLD AUTO: 0.01 K/UL
BASOPHILS NFR BLD AUTO: 0.2 %
BILIRUB SERPL-MCNC: 0.9 MG/DL (ref 0.1–1)
BUN SERPL-MCNC: 10 MG/DL (ref 6–20)
CALCIUM SERPL-MCNC: 8.8 MG/DL (ref 8.7–10.5)
CHLORIDE SERPL-SCNC: 107 MMOL/L (ref 95–110)
CHOLEST SERPL-MCNC: 125 MG/DL (ref 120–199)
CHOLEST/HDLC SERPL: 3.1 {RATIO} (ref 2–5)
CO2 SERPL-SCNC: 22 MMOL/L (ref 23–29)
CREAT SERPL-MCNC: 1 MG/DL (ref 0.5–1.4)
EAG (OHS): 105 MG/DL (ref 68–131)
ERYTHROCYTE [DISTWIDTH] IN BLOOD BY AUTOMATED COUNT: 14.7 % (ref 11.5–14.5)
FERRITIN SERPL-MCNC: 10 NG/ML (ref 20–300)
GFR SERPLBLD CREATININE-BSD FMLA CKD-EPI: >60 ML/MIN/1.73/M2
GLUCOSE SERPL-MCNC: 80 MG/DL (ref 70–110)
HBA1C MFR BLD: 5.3 % (ref 4–5.6)
HCT VFR BLD AUTO: 35.2 % (ref 37–48.5)
HDLC SERPL-MCNC: 40 MG/DL (ref 40–75)
HDLC SERPL: 32 % (ref 20–50)
HGB BLD-MCNC: 11.2 GM/DL (ref 12–16)
IMM GRANULOCYTES # BLD AUTO: 0.01 K/UL (ref 0–0.04)
IMM GRANULOCYTES NFR BLD AUTO: 0.2 % (ref 0–0.5)
IRON SATN MFR SERPL: 8 % (ref 20–50)
IRON SERPL-MCNC: 40 UG/DL (ref 30–160)
LDLC SERPL CALC-MCNC: 73.4 MG/DL (ref 63–159)
LYMPHOCYTES # BLD AUTO: 1.86 K/UL (ref 1–4.8)
MCH RBC QN AUTO: 27.2 PG (ref 27–31)
MCHC RBC AUTO-ENTMCNC: 31.8 G/DL (ref 32–36)
MCV RBC AUTO: 85 FL (ref 82–98)
NONHDLC SERPL-MCNC: 85 MG/DL
NUCLEATED RBC (/100WBC) (OHS): 0 /100 WBC
PLATELET # BLD AUTO: 340 K/UL (ref 150–450)
PMV BLD AUTO: 10.1 FL (ref 9.2–12.9)
POTASSIUM SERPL-SCNC: 3.8 MMOL/L (ref 3.5–5.1)
PROT SERPL-MCNC: 7.8 GM/DL (ref 6–8.4)
RBC # BLD AUTO: 4.12 M/UL (ref 4–5.4)
RELATIVE EOSINOPHIL (OHS): 1 %
RELATIVE LYMPHOCYTE (OHS): 45.4 % (ref 18–48)
RELATIVE MONOCYTE (OHS): 6.8 % (ref 4–15)
RELATIVE NEUTROPHIL (OHS): 46.4 % (ref 38–73)
SODIUM SERPL-SCNC: 139 MMOL/L (ref 136–145)
TIBC SERPL-MCNC: 485 UG/DL (ref 250–450)
TRANSFERRIN SERPL-MCNC: 328 MG/DL (ref 200–375)
TRIGL SERPL-MCNC: 58 MG/DL (ref 30–150)
TSH SERPL-ACNC: 1.87 UIU/ML (ref 0.4–4)
WBC # BLD AUTO: 4.1 K/UL (ref 3.9–12.7)

## 2025-07-01 PROCEDURE — 82374 ASSAY BLOOD CARBON DIOXIDE: CPT

## 2025-07-01 PROCEDURE — 3074F SYST BP LT 130 MM HG: CPT | Mod: CPTII,S$GLB,, | Performed by: FAMILY MEDICINE

## 2025-07-01 PROCEDURE — 36415 COLL VENOUS BLD VENIPUNCTURE: CPT

## 2025-07-01 PROCEDURE — 84478 ASSAY OF TRIGLYCERIDES: CPT

## 2025-07-01 PROCEDURE — 85025 COMPLETE CBC W/AUTO DIFF WBC: CPT

## 2025-07-01 PROCEDURE — 83036 HEMOGLOBIN GLYCOSYLATED A1C: CPT

## 2025-07-01 PROCEDURE — 99999 PR PBB SHADOW E&M-EST. PATIENT-LVL V: CPT | Mod: PBBFAC,,, | Performed by: FAMILY MEDICINE

## 2025-07-01 PROCEDURE — 3079F DIAST BP 80-89 MM HG: CPT | Mod: CPTII,S$GLB,, | Performed by: FAMILY MEDICINE

## 2025-07-01 PROCEDURE — 3008F BODY MASS INDEX DOCD: CPT | Mod: CPTII,S$GLB,, | Performed by: FAMILY MEDICINE

## 2025-07-01 PROCEDURE — 84443 ASSAY THYROID STIM HORMONE: CPT

## 2025-07-01 PROCEDURE — 82728 ASSAY OF FERRITIN: CPT

## 2025-07-01 PROCEDURE — 83540 ASSAY OF IRON: CPT

## 2025-07-01 PROCEDURE — 1159F MED LIST DOCD IN RCRD: CPT | Mod: CPTII,S$GLB,, | Performed by: FAMILY MEDICINE

## 2025-07-01 PROCEDURE — 99396 PREV VISIT EST AGE 40-64: CPT | Mod: S$GLB,,, | Performed by: FAMILY MEDICINE

## 2025-07-01 RX ORDER — ESOMEPRAZOLE MAGNESIUM 40 MG/1
20 CAPSULE, DELAYED RELEASE ORAL
COMMUNITY

## 2025-07-01 RX ORDER — SEMAGLUTIDE 0.25 MG/.5ML
0.25 INJECTION, SOLUTION SUBCUTANEOUS
Qty: 2 ML | Refills: 1 | Status: SHIPPED | OUTPATIENT
Start: 2025-07-01

## 2025-07-01 RX ORDER — AMLODIPINE BESYLATE 5 MG/1
5 TABLET ORAL DAILY
Qty: 90 TABLET | Refills: 4 | Status: SHIPPED | OUTPATIENT
Start: 2025-07-01 | End: 2026-07-01

## 2025-07-01 NOTE — PROGRESS NOTES
"     No data to display             [place] 12  Chief Complaint: No chief complaint on file.    History of Present Illness    CHIEF COMPLAINT:  Ms. Moss presents today to establish care and discuss weight loss options.    HYPERTENSION:  She was started on amlodipine 5 mg approximately one year ago by Dr. Rothman for blood pressure management. She reports the medication has been working effectively and is seeking consistent blood pressure management.    MENORRHAGIA:  She experiences heavy menstrual periods that significantly impact her daily functioning, describing being essentially immobilized during her menstrual cycle and stating she "can't move for the most part" during this time. She is scheduled for an endometrial ablation procedure to minimize her menstrual cycle symptoms and is hopeful the procedure will reduce the need for additional interventions such as iron supplementation.    IRON DEFICIENCY:  She reports ongoing iron deficiency with significant management challenges and experiences fatigue when iron levels are low. She has difficulty tolerating oral iron supplements due to severe constipation, which prevents consistent oral iron therapy. She previously received iron infusions last year but discontinued due to side effects and had a reaction during her prior iron infusion requiring additional medication for tolerance. She is currently not taking any oral iron supplements and acknowledges her iron levels are likely depleted but is open to potential iron infusion treatment.    SLEEP DISORDER:  She reports significant daytime sleepiness with high likelihood of falling asleep while reading, sometimes falls asleep watching TV, always falls asleep as a passenger in a car for an hour, sometimes falls asleep resting in the afternoon, and sometimes falls asleep after lunch. She is aware of potential sleep-related health risks including difficulties with weight management, potential for high blood pressure, and " increased diabetes risk.    ANXIETY:  She experiences anxiety symptoms but does not take any medication for management. She is able to handle her anxiety independently and reports that her most recent pregnancy seemed to have a positive impact on her mental health status.    PHYSICAL ACTIVITY:  She walks approximately one hour daily during work, engaging in ambulatory tasks around her facility.    CURRENT MEDICATIONS:  She currently takes Nexium for heartburn management and amlodipine 5 mg for blood pressure.    FAMILY HISTORY:  Positive family history of diabetes and hypertension. Her brother has been diagnosed with sleep apnea.          Objective:   Physical Exam    Vitals: Reviewed. Nursing note reviewed.  Constitutional: Alert.  HENT: Normocephalic. Atraumatic. External ears normal. Nose normal. PERRL. Conjunctivae normal.  Neck: ROM normal. Supple.  Cardiovascular: Normal rate and regular rhythm. Normal heart sounds.  Pulmonary: Normal breath sounds. Effort normal.  Abdominal: Bowel sounds are normal. Soft.  Musculoskeletal: ROM normal.  Skin: Warm. Dry.  Neurological: Oriented x3.  Psychiatric: Behavior normal. Thought content normal. Judgment normal.           7/1/2025   EPWORTH SLEEPINESS SCALE   Sitting and reading 3   Watching TV 2   Sitting, inactive in a public place (e.g. a theatre or a meeting) 0   As a passenger in a car for an hour without a break 3   Lying down to rest in the afternoon when circumstances permit 2   Sitting and talking to someone 0   Sitting quietly after a lunch without alcohol 2   In a car, while stopped for a few minutes in traffic 0   Total score 12      Assessment:       1. Routine health maintenance    2. Primary hypertension    3. Iron deficiency anemia due to chronic blood loss    4. Gastroesophageal reflux disease without esophagitis    5. Morbid obesity    6. Excessive bleeding in premenopausal period    7. Snoring    8. Fatigue, unspecified type        Plan:   Assessment &  Plan    Assessed current BP management with amlodipine 5 mg daily, which has been effective.  Evaluated risk factors for sleep apnea, including family history and [place] Sleepiness Scale score of 12.  Discussed potential for iron deficiency anemia due to heavy menstrual cycles, considering history of iron infusions and difficulty tolerating oral iron.  Noted anxiety but agreed with preference to address through weight loss and lifestyle changes rather than medication.  Considered weight loss options and explained mechanism of action and potential side effects of Wegovy or Zepbound, pending insurance coverage or direct-to- program enrollment.    PLAN SUMMARY:  Order comprehensive lab work including iron levels, cholesterol, kidney function, liver function tests, and HbA1c  Order home sleep study for sleep apnea evaluation  Prescribe Amlodipine 5 mg with 90 refills  Continue Nexium therapy for gastroesophageal reflux symptoms  Refer to gynecologist Dr. Anca Temple for endometrial ablation consultation  Discuss attempting oral iron supplements again  Provide information on gastric sleeve as an alternative to lap band surgery  Ms. Moss to contact office if enrolled in [employer] Direct program for Zepbound  Follow up in 1-2 months if weight loss medication is approved and started    HYPERTENSION:  Ms. Moss has been on antihypertensive medication for about a year with good response.  Noted family history of diabetes and hypertension as risk factors.  Prescribed Amlodipine 5 mg with 90 refills.  Ordered comprehensive lab work including cholesterol, kidney function, liver function tests, HbA1c, and iron levels.  Emphasized importance of establishing consistent care for proper hypertension management.    HEAVY MENSTRUATION:  Ms. Moss reports heavy periods requiring intervention.  Assessed candidacy for endometrial ablation to address heavy menstruation, which would also help reduce the need for iron  supplementation.  Referred to gynecologist Dr. Anca Temple for ablation procedure consultation.    IRON DEFICIENCY ANEMIA:  Ms. Moss experiences fatigue when iron levels are low and received iron infusions last year.  Despite previous intolerance to oral iron supplements, discussed attempting them again due to the high cost of infusions.  Iron level testing ordered as part of comprehensive lab work.  Noted that endometrial ablation could also benefit by reducing iron loss if current levels are found to be low.    HYPERSOMNIA / SLEEP APNEA:  Ms. Moss has high probability of falling asleep in various situations with an [place] score of 12.  Assessed for sleep apnea risk factors and symptoms of hypersomnia.  Discussed sleep apnea, its health implications, and the simplified at-home diagnostic process.  Ordered home sleep study for evaluation.    ANXIETY:  Ms. Moss reports anxiety symptoms but does not currently take medication for this condition.    GASTROESOPHAGEAL REFLUX DISEASE:  Continued Nexium therapy for management of gastroesophageal reflux symptoms.    WEIGHT MANAGEMENT:  Provided information on gastric sleeve as a more effective and safer alternative to lap band surgery for weight loss.    FOLLOW-UP:  Follow up in 1-2 months if weight loss medication is approved and started.  Ms. Moss to contact the office if able to enroll in [employer] Direct program for Zepbound.        Routine health maintenance  -     Hemoglobin A1C; Future; Expected date: 07/01/2026  -     Comprehensive Metabolic Panel; Future; Expected date: 07/01/2026  -     Lipid Panel; Future; Expected date: 07/01/2026  -     CBC Auto Differential; Future; Expected date: 07/01/2026    Primary hypertension  -     Hemoglobin A1C; Future; Expected date: 07/01/2025  -     Lipid Panel; Future; Expected date: 07/01/2025  -     Comprehensive Metabolic Panel; Future; Expected date: 07/01/2025  -     TSH; Future; Expected date: 07/01/2025  -      amLODIPine (NORVASC) 5 MG tablet; Take 1 tablet (5 mg total) by mouth once daily.  Dispense: 90 tablet; Refill: 4    Iron deficiency anemia due to chronic blood loss  -     CBC Auto Differential; Future; Expected date: 07/01/2025  -     Ferritin; Future; Expected date: 07/01/2025  -     Iron and TIBC; Future; Expected date: 07/01/2025    Gastroesophageal reflux disease without esophagitis    Morbid obesity  -     semaglutide, weight loss, (WEGOVY) 0.25 mg/0.5 mL PnIj; Inject 0.25 mg into the skin every 7 days.  Dispense: 2 mL; Refill: 1    Excessive bleeding in premenopausal period    Snoring    Fatigue, unspecified type  -     Home Sleep Study; Future  -     Ambulatory referral/consult to Sleep Disorders; Future; Expected date: 07/08/2025     Lab today  Also Lab 12 months and follow up after

## 2025-07-02 ENCOUNTER — TELEPHONE (OUTPATIENT)
Dept: SLEEP MEDICINE | Facility: CLINIC | Age: 40
End: 2025-07-02
Payer: COMMERCIAL

## 2025-07-08 ENCOUNTER — PATIENT MESSAGE (OUTPATIENT)
Dept: PULMONOLOGY | Facility: CLINIC | Age: 40
End: 2025-07-08
Payer: COMMERCIAL

## 2025-07-29 ENCOUNTER — TELEPHONE (OUTPATIENT)
Dept: INTERNAL MEDICINE | Facility: CLINIC | Age: 40
End: 2025-07-29
Payer: COMMERCIAL

## 2025-07-29 NOTE — TELEPHONE ENCOUNTER
Copied from CRM #[id number]. Topic: General Inquiry - Return Call  >> Jul 29, 2025  4:40 PM Elaine wrote:  Type:  Patient Returning Call    Who Called:Brian Moss   Who Left Message for Patient:Unsure  Does the patient know what this is regarding?:test results  Would the patient rather a call back or a response via [facility]? Call back  Best Call Back Number:[phone]   Thanks!